# Patient Record
Sex: FEMALE | Race: WHITE | Employment: FULL TIME | URBAN - METROPOLITAN AREA
[De-identification: names, ages, dates, MRNs, and addresses within clinical notes are randomized per-mention and may not be internally consistent; named-entity substitution may affect disease eponyms.]

---

## 2018-04-03 ENCOUNTER — HOSPITAL ENCOUNTER (OUTPATIENT)
Facility: CLINIC | Age: 43
Discharge: HOME | End: 2018-04-03
Payer: COMMERCIAL

## 2018-04-03 LAB
T3 FREE: 1.78 PG/ML (ref 2.02–4.43)
THYROXINE, FREE: 1.38 NG/DL (ref 0.93–1.7)
TSH SERPL DL<=0.05 MIU/L-ACNC: 1.87 MIU/L (ref 0.3–5)

## 2018-04-03 PROCEDURE — 84443 ASSAY THYROID STIM HORMONE: CPT

## 2018-04-03 PROCEDURE — 84439 ASSAY OF FREE THYROXINE: CPT

## 2018-04-03 PROCEDURE — 36415 COLL VENOUS BLD VENIPUNCTURE: CPT

## 2018-04-03 PROCEDURE — 84481 FREE ASSAY (FT-3): CPT

## 2019-10-05 ENCOUNTER — HOSPITAL ENCOUNTER (OUTPATIENT)
Facility: CLINIC | Age: 44
Discharge: HOME | End: 2019-10-05
Payer: COMMERCIAL

## 2019-10-05 LAB
-: NORMAL
ALBUMIN SERPL-MCNC: 4.3 G/DL (ref 3.5–5.2)
ALBUMIN/GLOBULIN RATIO: 1.7 (ref 1–2.5)
ALP BLD-CCNC: 63 U/L (ref 35–104)
ALT SERPL-CCNC: 24 U/L (ref 5–33)
AMORPHOUS: NORMAL
ANION GAP SERPL CALCULATED.3IONS-SCNC: 13 MMOL/L (ref 9–17)
AST SERPL-CCNC: 34 U/L
BACTERIA: NORMAL
BILIRUB SERPL-MCNC: 0.9 MG/DL (ref 0.3–1.2)
BILIRUBIN URINE: ABNORMAL
BUN BLDV-MCNC: 18 MG/DL (ref 6–20)
BUN/CREAT BLD: ABNORMAL (ref 9–20)
CALCIUM SERPL-MCNC: 9.7 MG/DL (ref 8.6–10.4)
CASTS UA: NORMAL /LPF (ref 0–8)
CHLORIDE BLD-SCNC: 108 MMOL/L (ref 98–107)
CHOLESTEROL, FASTING: 162 MG/DL
CHOLESTEROL/HDL RATIO: 2.8
CO2: 27 MMOL/L (ref 20–31)
COLOR: ABNORMAL
COMMENT UA: ABNORMAL
CREAT SERPL-MCNC: 0.89 MG/DL (ref 0.5–0.9)
CRYSTALS, UA: NORMAL /HPF
EPITHELIAL CELLS UA: NORMAL /HPF (ref 0–5)
GFR AFRICAN AMERICAN: >60 ML/MIN
GFR NON-AFRICAN AMERICAN: >60 ML/MIN
GFR SERPL CREATININE-BSD FRML MDRD: ABNORMAL ML/MIN/{1.73_M2}
GFR SERPL CREATININE-BSD FRML MDRD: ABNORMAL ML/MIN/{1.73_M2}
GLUCOSE FASTING: 86 MG/DL (ref 70–99)
GLUCOSE URINE: NEGATIVE
HCT VFR BLD CALC: 43.4 % (ref 36.3–47.1)
HDLC SERPL-MCNC: 58 MG/DL
HEMOGLOBIN: 13.5 G/DL (ref 11.9–15.1)
KETONES, URINE: NEGATIVE
LDL CHOLESTEROL: 90 MG/DL (ref 0–130)
LEUKOCYTE ESTERASE, URINE: ABNORMAL
MCH RBC QN AUTO: 32.5 PG (ref 25.2–33.5)
MCHC RBC AUTO-ENTMCNC: 31.1 G/DL (ref 28.4–34.8)
MCV RBC AUTO: 104.6 FL (ref 82.6–102.9)
MUCUS: NORMAL
NITRITE, URINE: NEGATIVE
NRBC AUTOMATED: 0 PER 100 WBC
OTHER OBSERVATIONS UA: NORMAL
PDW BLD-RTO: 13.5 % (ref 11.8–14.4)
PH UA: 7 (ref 5–8)
PLATELET # BLD: 191 K/UL (ref 138–453)
PMV BLD AUTO: 11.7 FL (ref 8.1–13.5)
POTASSIUM SERPL-SCNC: 5.5 MMOL/L (ref 3.7–5.3)
PROTEIN UA: NEGATIVE
RBC # BLD: 4.15 M/UL (ref 3.95–5.11)
RBC UA: NORMAL /HPF (ref 0–4)
RENAL EPITHELIAL, UA: NORMAL /HPF
SODIUM BLD-SCNC: 148 MMOL/L (ref 135–144)
SPECIFIC GRAVITY UA: 1.02 (ref 1–1.03)
T3 FREE: 2.12 PG/ML (ref 2.02–4.43)
THYROXINE, FREE: 1.36 NG/DL (ref 0.93–1.7)
TOTAL PROTEIN: 6.9 G/DL (ref 6.4–8.3)
TRICHOMONAS: NORMAL
TRIGLYCERIDE, FASTING: 72 MG/DL
TSH SERPL DL<=0.05 MIU/L-ACNC: 2.96 MIU/L (ref 0.3–5)
TURBIDITY: CLEAR
URINE HGB: NEGATIVE
UROBILINOGEN, URINE: NORMAL
VLDLC SERPL CALC-MCNC: NORMAL MG/DL (ref 1–30)
WBC # BLD: 4.1 K/UL (ref 3.5–11.3)
WBC UA: NORMAL /HPF (ref 0–5)
YEAST: NORMAL

## 2019-10-05 PROCEDURE — 87086 URINE CULTURE/COLONY COUNT: CPT

## 2019-10-05 PROCEDURE — 84481 FREE ASSAY (FT-3): CPT

## 2019-10-05 PROCEDURE — 84439 ASSAY OF FREE THYROXINE: CPT

## 2019-10-05 PROCEDURE — 81001 URINALYSIS AUTO W/SCOPE: CPT

## 2019-10-05 PROCEDURE — 80053 COMPREHEN METABOLIC PANEL: CPT

## 2019-10-05 PROCEDURE — 36415 COLL VENOUS BLD VENIPUNCTURE: CPT

## 2019-10-05 PROCEDURE — 80061 LIPID PANEL: CPT

## 2019-10-05 PROCEDURE — 85027 COMPLETE CBC AUTOMATED: CPT

## 2019-10-05 PROCEDURE — 84443 ASSAY THYROID STIM HORMONE: CPT

## 2019-10-06 LAB
CULTURE: NO GROWTH
Lab: NORMAL
SPECIMEN DESCRIPTION: NORMAL

## 2020-01-29 ENCOUNTER — HOSPITAL ENCOUNTER (OUTPATIENT)
Facility: CLINIC | Age: 45
Discharge: HOME | End: 2020-01-29
Payer: COMMERCIAL

## 2020-01-29 LAB — HCG(URINE) PREGNANCY TEST: NEGATIVE

## 2020-01-29 PROCEDURE — 81025 URINE PREGNANCY TEST: CPT

## 2020-02-29 ENCOUNTER — HOSPITAL ENCOUNTER (OUTPATIENT)
Facility: CLINIC | Age: 45
Discharge: HOME | End: 2020-02-29
Payer: COMMERCIAL

## 2020-02-29 LAB
ABSOLUTE EOS #: 0.06 K/UL (ref 0–0.44)
ABSOLUTE IMMATURE GRANULOCYTE: <0.03 K/UL (ref 0–0.3)
ABSOLUTE LYMPH #: 0.89 K/UL (ref 1.1–3.7)
ABSOLUTE MONO #: 0.37 K/UL (ref 0.1–1.2)
ALT SERPL-CCNC: 29 U/L (ref 5–33)
AST SERPL-CCNC: 45 U/L
BASOPHILS # BLD: 1 % (ref 0–2)
BASOPHILS ABSOLUTE: 0.03 K/UL (ref 0–0.2)
CHOLESTEROL, FASTING: 176 MG/DL
CHOLESTEROL/HDL RATIO: 2.6
DIFFERENTIAL TYPE: ABNORMAL
EOSINOPHILS RELATIVE PERCENT: 1 % (ref 1–4)
GLUCOSE FASTING: 81 MG/DL (ref 70–99)
HCG QUANTITATIVE: <1 IU/L
HCT VFR BLD CALC: 44 % (ref 36.3–47.1)
HDLC SERPL-MCNC: 67 MG/DL
HEMOGLOBIN: 13.9 G/DL (ref 11.9–15.1)
IMMATURE GRANULOCYTES: 0 %
LDL CHOLESTEROL: 88 MG/DL (ref 0–130)
LYMPHOCYTES # BLD: 17 % (ref 24–43)
MCH RBC QN AUTO: 32.3 PG (ref 25.2–33.5)
MCHC RBC AUTO-ENTMCNC: 31.6 G/DL (ref 28.4–34.8)
MCV RBC AUTO: 102.3 FL (ref 82.6–102.9)
MONOCYTES # BLD: 7 % (ref 3–12)
NRBC AUTOMATED: 0 PER 100 WBC
PDW BLD-RTO: 13.7 % (ref 11.8–14.4)
PLATELET # BLD: 178 K/UL (ref 138–453)
PLATELET ESTIMATE: ABNORMAL
PMV BLD AUTO: 11.9 FL (ref 8.1–13.5)
RBC # BLD: 4.3 M/UL (ref 3.95–5.11)
RBC # BLD: ABNORMAL 10*6/UL
SEG NEUTROPHILS: 74 % (ref 36–65)
SEGMENTED NEUTROPHILS ABSOLUTE COUNT: 3.85 K/UL (ref 1.5–8.1)
TRIGLYCERIDE, FASTING: 103 MG/DL
VLDLC SERPL CALC-MCNC: NORMAL MG/DL (ref 1–30)
WBC # BLD: 5.2 K/UL (ref 3.5–11.3)
WBC # BLD: ABNORMAL 10*3/UL

## 2020-02-29 PROCEDURE — 84460 ALANINE AMINO (ALT) (SGPT): CPT

## 2020-02-29 PROCEDURE — 84702 CHORIONIC GONADOTROPIN TEST: CPT

## 2020-02-29 PROCEDURE — 36415 COLL VENOUS BLD VENIPUNCTURE: CPT

## 2020-02-29 PROCEDURE — 80061 LIPID PANEL: CPT

## 2020-02-29 PROCEDURE — 85025 COMPLETE CBC W/AUTO DIFF WBC: CPT

## 2020-02-29 PROCEDURE — 82947 ASSAY GLUCOSE BLOOD QUANT: CPT

## 2020-02-29 PROCEDURE — 84450 TRANSFERASE (AST) (SGOT): CPT

## 2020-05-05 ENCOUNTER — HOSPITAL ENCOUNTER (OUTPATIENT)
Age: 45
Setting detail: SPECIMEN
Discharge: HOME | End: 2020-05-05
Payer: COMMERCIAL

## 2020-05-05 LAB
ABSOLUTE EOS #: 0.09 K/UL (ref 0–0.44)
ABSOLUTE IMMATURE GRANULOCYTE: <0.03 K/UL (ref 0–0.3)
ABSOLUTE LYMPH #: 0.79 K/UL (ref 1.1–3.7)
ABSOLUTE MONO #: 0.43 K/UL (ref 0.1–1.2)
ALT SERPL-CCNC: 43 U/L (ref 5–33)
AST SERPL-CCNC: 49 U/L
BASOPHILS # BLD: 1 % (ref 0–2)
BASOPHILS ABSOLUTE: 0.03 K/UL (ref 0–0.2)
CHOLESTEROL, FASTING: 189 MG/DL
CHOLESTEROL/HDL RATIO: 3.5
DIFFERENTIAL TYPE: ABNORMAL
EOSINOPHILS RELATIVE PERCENT: 3 % (ref 1–4)
GLUCOSE FASTING: 89 MG/DL (ref 70–99)
HCG QUANTITATIVE: <1 IU/L
HCT VFR BLD CALC: 43.6 % (ref 36.3–47.1)
HDLC SERPL-MCNC: 54 MG/DL
HEMOGLOBIN: 13.8 G/DL (ref 11.9–15.1)
IMMATURE GRANULOCYTES: 0 %
LDL CHOLESTEROL: 118 MG/DL (ref 0–130)
LYMPHOCYTES # BLD: 22 % (ref 24–43)
MCH RBC QN AUTO: 33.2 PG (ref 25.2–33.5)
MCHC RBC AUTO-ENTMCNC: 31.7 G/DL (ref 28.4–34.8)
MCV RBC AUTO: 104.8 FL (ref 82.6–102.9)
MONOCYTES # BLD: 12 % (ref 3–12)
NRBC AUTOMATED: 0 PER 100 WBC
PDW BLD-RTO: 13.9 % (ref 11.8–14.4)
PLATELET # BLD: 183 K/UL (ref 138–453)
PLATELET ESTIMATE: ABNORMAL
PMV BLD AUTO: 11.7 FL (ref 8.1–13.5)
RBC # BLD: 4.16 M/UL (ref 3.95–5.11)
RBC # BLD: ABNORMAL 10*6/UL
SEG NEUTROPHILS: 62 % (ref 36–65)
SEGMENTED NEUTROPHILS ABSOLUTE COUNT: 2.33 K/UL (ref 1.5–8.1)
TRIGLYCERIDE, FASTING: 83 MG/DL
VLDLC SERPL CALC-MCNC: NORMAL MG/DL (ref 1–30)
WBC # BLD: 3.7 K/UL (ref 3.5–11.3)
WBC # BLD: ABNORMAL 10*3/UL

## 2020-05-05 PROCEDURE — 36415 COLL VENOUS BLD VENIPUNCTURE: CPT

## 2020-05-05 PROCEDURE — 80061 LIPID PANEL: CPT

## 2020-05-05 PROCEDURE — 85025 COMPLETE CBC W/AUTO DIFF WBC: CPT

## 2020-05-05 PROCEDURE — 84702 CHORIONIC GONADOTROPIN TEST: CPT

## 2020-05-05 PROCEDURE — 82947 ASSAY GLUCOSE BLOOD QUANT: CPT

## 2020-05-05 PROCEDURE — 84460 ALANINE AMINO (ALT) (SGPT): CPT

## 2020-05-05 PROCEDURE — 84450 TRANSFERASE (AST) (SGOT): CPT

## 2020-06-02 ENCOUNTER — HOSPITAL ENCOUNTER (OUTPATIENT)
Facility: CLINIC | Age: 45
Discharge: HOME | End: 2020-06-02
Payer: COMMERCIAL

## 2020-06-02 LAB — HCG(URINE) PREGNANCY TEST: NEGATIVE

## 2020-06-02 PROCEDURE — 81025 URINE PREGNANCY TEST: CPT

## 2020-07-08 ENCOUNTER — HOSPITAL ENCOUNTER (OUTPATIENT)
Facility: CLINIC | Age: 45
Discharge: HOME | End: 2020-07-08
Payer: COMMERCIAL

## 2020-07-08 LAB
ABSOLUTE EOS #: 0.07 K/UL (ref 0–0.44)
ABSOLUTE IMMATURE GRANULOCYTE: <0.03 K/UL (ref 0–0.3)
ABSOLUTE LYMPH #: 1.05 K/UL (ref 1.1–3.7)
ABSOLUTE MONO #: 0.48 K/UL (ref 0.1–1.2)
ALT SERPL-CCNC: 31 U/L (ref 5–33)
AST SERPL-CCNC: 41 U/L
BASOPHILS # BLD: 1 % (ref 0–2)
BASOPHILS ABSOLUTE: 0.03 K/UL (ref 0–0.2)
CHOLESTEROL/HDL RATIO: 3.2
CHOLESTEROL: 221 MG/DL
DIFFERENTIAL TYPE: ABNORMAL
EOSINOPHILS RELATIVE PERCENT: 2 % (ref 1–4)
GLUCOSE BLD-MCNC: 96 MG/DL (ref 70–99)
HCG QUANTITATIVE: <1 IU/L
HCT VFR BLD CALC: 43.7 % (ref 36.3–47.1)
HDLC SERPL-MCNC: 69 MG/DL
HEMOGLOBIN: 13.9 G/DL (ref 11.9–15.1)
IMMATURE GRANULOCYTES: 0 %
LDL CHOLESTEROL: 134 MG/DL (ref 0–130)
LYMPHOCYTES # BLD: 32 % (ref 24–43)
MCH RBC QN AUTO: 33.6 PG (ref 25.2–33.5)
MCHC RBC AUTO-ENTMCNC: 31.8 G/DL (ref 28.4–34.8)
MCV RBC AUTO: 105.6 FL (ref 82.6–102.9)
MONOCYTES # BLD: 15 % (ref 3–12)
NRBC AUTOMATED: 0 PER 100 WBC
PDW BLD-RTO: 13.7 % (ref 11.8–14.4)
PLATELET # BLD: 201 K/UL (ref 138–453)
PLATELET ESTIMATE: ABNORMAL
PMV BLD AUTO: 11.9 FL (ref 8.1–13.5)
RBC # BLD: 4.14 M/UL (ref 3.95–5.11)
RBC # BLD: ABNORMAL 10*6/UL
SEG NEUTROPHILS: 50 % (ref 36–65)
SEGMENTED NEUTROPHILS ABSOLUTE COUNT: 1.63 K/UL (ref 1.5–8.1)
TRIGL SERPL-MCNC: 91 MG/DL
VLDLC SERPL CALC-MCNC: ABNORMAL MG/DL (ref 1–30)
WBC # BLD: 3.3 K/UL (ref 3.5–11.3)
WBC # BLD: ABNORMAL 10*3/UL

## 2020-07-08 PROCEDURE — 84702 CHORIONIC GONADOTROPIN TEST: CPT

## 2020-07-08 PROCEDURE — 36415 COLL VENOUS BLD VENIPUNCTURE: CPT

## 2020-07-08 PROCEDURE — 84460 ALANINE AMINO (ALT) (SGPT): CPT

## 2020-07-08 PROCEDURE — 85025 COMPLETE CBC W/AUTO DIFF WBC: CPT

## 2020-07-08 PROCEDURE — 82947 ASSAY GLUCOSE BLOOD QUANT: CPT

## 2020-07-08 PROCEDURE — 80061 LIPID PANEL: CPT

## 2020-07-08 PROCEDURE — 84450 TRANSFERASE (AST) (SGOT): CPT

## 2020-08-10 ENCOUNTER — HOSPITAL ENCOUNTER (OUTPATIENT)
Facility: CLINIC | Age: 45
Discharge: HOME | End: 2020-08-10
Payer: COMMERCIAL

## 2020-08-10 LAB — HCG(URINE) PREGNANCY TEST: NEGATIVE

## 2020-08-10 PROCEDURE — 81025 URINE PREGNANCY TEST: CPT

## 2020-10-09 ENCOUNTER — HOSPITAL ENCOUNTER (EMERGENCY)
Facility: CLINIC | Age: 45
Discharge: ANOTHER HOSPITAL | End: 2020-10-09
Attending: EMERGENCY MEDICINE
Payer: COMMERCIAL

## 2020-10-09 ENCOUNTER — APPOINTMENT (OUTPATIENT)
Dept: CT IMAGING | Facility: CLINIC | Age: 45
End: 2020-10-09
Payer: COMMERCIAL

## 2020-10-09 VITALS
SYSTOLIC BLOOD PRESSURE: 118 MMHG | RESPIRATION RATE: 17 BRPM | OXYGEN SATURATION: 100 % | HEART RATE: 44 BPM | HEIGHT: 70 IN | DIASTOLIC BLOOD PRESSURE: 72 MMHG | TEMPERATURE: 98.1 F | WEIGHT: 145 LBS | BODY MASS INDEX: 20.76 KG/M2

## 2020-10-09 LAB
-: ABNORMAL
ABSOLUTE EOS #: 0 K/UL (ref 0–0.4)
ABSOLUTE IMMATURE GRANULOCYTE: ABNORMAL K/UL (ref 0–0.3)
ABSOLUTE LYMPH #: 0.9 K/UL (ref 1–4.8)
ABSOLUTE MONO #: 0.3 K/UL (ref 0.1–1.2)
ALBUMIN SERPL-MCNC: 4.4 G/DL (ref 3.5–5.2)
ALBUMIN/GLOBULIN RATIO: 2.1 (ref 1–2.5)
ALP BLD-CCNC: 75 U/L (ref 35–104)
ALT SERPL-CCNC: 21 U/L (ref 5–33)
AMORPHOUS: ABNORMAL
ANION GAP SERPL CALCULATED.3IONS-SCNC: 9 MMOL/L (ref 9–17)
AST SERPL-CCNC: 27 U/L
BACTERIA: ABNORMAL
BASOPHILS # BLD: 3 % (ref 0–2)
BASOPHILS ABSOLUTE: 0.1 K/UL (ref 0–0.2)
BILIRUB SERPL-MCNC: 0.4 MG/DL (ref 0.3–1.2)
BILIRUBIN URINE: NEGATIVE
BUN BLDV-MCNC: 28 MG/DL (ref 6–20)
BUN/CREAT BLD: ABNORMAL (ref 9–20)
CALCIUM SERPL-MCNC: 9 MG/DL (ref 8.6–10.4)
CASTS UA: ABNORMAL /LPF (ref 0–2)
CHLORIDE BLD-SCNC: 105 MMOL/L (ref 98–107)
CO2: 25 MMOL/L (ref 20–31)
COLOR: YELLOW
COMMENT UA: ABNORMAL
CREAT SERPL-MCNC: 0.6 MG/DL (ref 0.5–0.9)
CRYSTALS, UA: ABNORMAL /HPF
DIFFERENTIAL TYPE: ABNORMAL
EOSINOPHILS RELATIVE PERCENT: 1 % (ref 1–4)
EPITHELIAL CELLS UA: ABNORMAL /HPF (ref 0–5)
GFR AFRICAN AMERICAN: >60 ML/MIN
GFR NON-AFRICAN AMERICAN: >60 ML/MIN
GFR SERPL CREATININE-BSD FRML MDRD: ABNORMAL ML/MIN/{1.73_M2}
GFR SERPL CREATININE-BSD FRML MDRD: ABNORMAL ML/MIN/{1.73_M2}
GLUCOSE BLD-MCNC: 114 MG/DL (ref 70–99)
GLUCOSE URINE: NEGATIVE
HCG QUALITATIVE: NEGATIVE
HCT VFR BLD CALC: 37.7 % (ref 36–46)
HEMOGLOBIN: 12.2 G/DL (ref 12–16)
IMMATURE GRANULOCYTES: ABNORMAL %
KETONES, URINE: NEGATIVE
LEUKOCYTE ESTERASE, URINE: ABNORMAL
LIPASE: 15 U/L (ref 13–60)
LYMPHOCYTES # BLD: 22 % (ref 24–44)
MCH RBC QN AUTO: 32.3 PG (ref 26–34)
MCHC RBC AUTO-ENTMCNC: 32.5 G/DL (ref 31–37)
MCV RBC AUTO: 99.5 FL (ref 80–100)
MONOCYTES # BLD: 8 % (ref 2–11)
MUCUS: ABNORMAL
NITRITE, URINE: NEGATIVE
NRBC AUTOMATED: ABNORMAL PER 100 WBC
OTHER OBSERVATIONS UA: ABNORMAL
PDW BLD-RTO: 14.3 % (ref 12.5–15.4)
PH UA: 7 (ref 5–8)
PLATELET # BLD: 154 K/UL (ref 140–450)
PLATELET ESTIMATE: ABNORMAL
PMV BLD AUTO: 9.1 FL (ref 6–12)
POTASSIUM SERPL-SCNC: 4.1 MMOL/L (ref 3.7–5.3)
PROTEIN UA: NEGATIVE
RBC # BLD: 3.79 M/UL (ref 4–5.2)
RBC # BLD: ABNORMAL 10*6/UL
RBC UA: ABNORMAL /HPF (ref 0–2)
RENAL EPITHELIAL, UA: ABNORMAL /HPF
SEG NEUTROPHILS: 66 % (ref 36–66)
SEGMENTED NEUTROPHILS ABSOLUTE COUNT: 2.8 K/UL (ref 1.8–7.7)
SODIUM BLD-SCNC: 139 MMOL/L (ref 135–144)
SPECIFIC GRAVITY UA: 1.02 (ref 1–1.03)
TOTAL PROTEIN: 6.5 G/DL (ref 6.4–8.3)
TRICHOMONAS: ABNORMAL
TURBIDITY: CLEAR
URINE HGB: NEGATIVE
UROBILINOGEN, URINE: NORMAL
WBC # BLD: 4.1 K/UL (ref 3.5–11)
WBC # BLD: ABNORMAL 10*3/UL
WBC UA: ABNORMAL /HPF (ref 0–5)
YEAST: ABNORMAL

## 2020-10-09 PROCEDURE — 80053 COMPREHEN METABOLIC PANEL: CPT

## 2020-10-09 PROCEDURE — 96365 THER/PROPH/DIAG IV INF INIT: CPT

## 2020-10-09 PROCEDURE — 2580000003 HC RX 258: Performed by: EMERGENCY MEDICINE

## 2020-10-09 PROCEDURE — 81001 URINALYSIS AUTO W/SCOPE: CPT

## 2020-10-09 PROCEDURE — 96375 TX/PRO/DX INJ NEW DRUG ADDON: CPT

## 2020-10-09 PROCEDURE — 74177 CT ABD & PELVIS W/CONTRAST: CPT

## 2020-10-09 PROCEDURE — 96376 TX/PRO/DX INJ SAME DRUG ADON: CPT

## 2020-10-09 PROCEDURE — 2500000003 HC RX 250 WO HCPCS: Performed by: EMERGENCY MEDICINE

## 2020-10-09 PROCEDURE — 6360000004 HC RX CONTRAST MEDICATION: Performed by: EMERGENCY MEDICINE

## 2020-10-09 PROCEDURE — 99284 EMERGENCY DEPT VISIT MOD MDM: CPT

## 2020-10-09 PROCEDURE — 83690 ASSAY OF LIPASE: CPT

## 2020-10-09 PROCEDURE — 6360000002 HC RX W HCPCS: Performed by: EMERGENCY MEDICINE

## 2020-10-09 PROCEDURE — 36415 COLL VENOUS BLD VENIPUNCTURE: CPT

## 2020-10-09 PROCEDURE — 84703 CHORIONIC GONADOTROPIN ASSAY: CPT

## 2020-10-09 PROCEDURE — 85025 COMPLETE CBC W/AUTO DIFF WBC: CPT

## 2020-10-09 RX ORDER — LEVOTHYROXINE SODIUM 88 UG/1
88 TABLET ORAL DAILY
COMMUNITY
End: 2021-04-20 | Stop reason: SDUPTHER

## 2020-10-09 RX ORDER — SODIUM CHLORIDE 0.9 % (FLUSH) 0.9 %
10 SYRINGE (ML) INJECTION PRN
Status: DISCONTINUED | OUTPATIENT
Start: 2020-10-09 | End: 2020-10-09 | Stop reason: HOSPADM

## 2020-10-09 RX ORDER — CIPROFLOXACIN 2 MG/ML
400 INJECTION, SOLUTION INTRAVENOUS ONCE
Status: COMPLETED | OUTPATIENT
Start: 2020-10-09 | End: 2020-10-09

## 2020-10-09 RX ORDER — MORPHINE SULFATE 2 MG/ML
2 INJECTION, SOLUTION INTRAMUSCULAR; INTRAVENOUS ONCE
Status: COMPLETED | OUTPATIENT
Start: 2020-10-09 | End: 2020-10-09

## 2020-10-09 RX ORDER — ONDANSETRON 2 MG/ML
4 INJECTION INTRAMUSCULAR; INTRAVENOUS ONCE
Status: COMPLETED | OUTPATIENT
Start: 2020-10-09 | End: 2020-10-09

## 2020-10-09 RX ORDER — KETOROLAC TROMETHAMINE 30 MG/ML
30 INJECTION, SOLUTION INTRAMUSCULAR; INTRAVENOUS ONCE
Status: COMPLETED | OUTPATIENT
Start: 2020-10-09 | End: 2020-10-09

## 2020-10-09 RX ORDER — 0.9 % SODIUM CHLORIDE 0.9 %
70 INTRAVENOUS SOLUTION INTRAVENOUS ONCE
Status: COMPLETED | OUTPATIENT
Start: 2020-10-09 | End: 2020-10-09

## 2020-10-09 RX ADMIN — MORPHINE SULFATE 2 MG: 2 INJECTION, SOLUTION INTRAMUSCULAR; INTRAVENOUS at 17:38

## 2020-10-09 RX ADMIN — CIPROFLOXACIN 400 MG: 2 INJECTION, SOLUTION INTRAVENOUS at 17:13

## 2020-10-09 RX ADMIN — SODIUM CHLORIDE, PRESERVATIVE FREE 10 ML: 5 INJECTION INTRAVENOUS at 14:08

## 2020-10-09 RX ADMIN — ONDANSETRON 4 MG: 2 INJECTION INTRAMUSCULAR; INTRAVENOUS at 17:36

## 2020-10-09 RX ADMIN — METRONIDAZOLE 500 MG: 500 INJECTION, SOLUTION INTRAVENOUS at 18:24

## 2020-10-09 RX ADMIN — SODIUM CHLORIDE 70 ML: 9 INJECTION, SOLUTION INTRAVENOUS at 14:08

## 2020-10-09 RX ADMIN — KETOROLAC TROMETHAMINE 30 MG: 30 INJECTION, SOLUTION INTRAMUSCULAR at 13:19

## 2020-10-09 RX ADMIN — IOPAMIDOL 75 ML: 755 INJECTION, SOLUTION INTRAVENOUS at 14:08

## 2020-10-09 RX ADMIN — ONDANSETRON 4 MG: 2 INJECTION INTRAMUSCULAR; INTRAVENOUS at 13:17

## 2020-10-09 SDOH — HEALTH STABILITY: MENTAL HEALTH: HOW OFTEN DO YOU HAVE A DRINK CONTAINING ALCOHOL?: NEVER

## 2020-10-09 ASSESSMENT — ENCOUNTER SYMPTOMS
ABDOMINAL PAIN: 1
CONSTIPATION: 0
SHORTNESS OF BREATH: 0
ABDOMINAL DISTENTION: 1
BACK PAIN: 1
BLOOD IN STOOL: 0
VOMITING: 0
EYE PAIN: 0
DIARRHEA: 0
NAUSEA: 1
COUGH: 0

## 2020-10-09 ASSESSMENT — PAIN DESCRIPTION - LOCATION
LOCATION: ABDOMEN
LOCATION: ABDOMEN

## 2020-10-09 ASSESSMENT — PAIN DESCRIPTION - ORIENTATION: ORIENTATION: LOWER

## 2020-10-09 ASSESSMENT — PAIN SCALES - GENERAL
PAINLEVEL_OUTOF10: 6
PAINLEVEL_OUTOF10: 6
PAINLEVEL_OUTOF10: 3
PAINLEVEL_OUTOF10: 6

## 2020-10-09 ASSESSMENT — PAIN DESCRIPTION - PAIN TYPE
TYPE: ACUTE PAIN
TYPE: ACUTE PAIN

## 2020-10-09 ASSESSMENT — PAIN DESCRIPTION - FREQUENCY: FREQUENCY: CONTINUOUS

## 2020-10-09 ASSESSMENT — PAIN DESCRIPTION - DESCRIPTORS: DESCRIPTORS: SHARP

## 2020-10-09 NOTE — ED NOTES
[facility]. TRANSPORT HERE REPORT TO HASEEB Scott RN  10/09/20 1946

## 2020-10-09 NOTE — ED NOTES
[facility] notified of potential admission to [facility], [address] [place] [address]  10/09/20 2588

## 2020-10-09 NOTE — ED NOTES
Dr. Sylvie Lala speaking with Dr. Bri Braga regarding pat admission.  Pt and family informed 8-10 hr wait for beds at [facility]  10/09/20 STEVE Cooper  10/09/20 3000

## 2020-10-09 NOTE — ED NOTES
Dr Smith Devoid speaking to Dr. Ernestine Vang I regarding patient admission     Zelalem Roberto RN  10/09/20 6439

## 2020-10-09 NOTE — ED NOTES
[facility] [place] [phone].       Reyes Sciara, RN  10/09/20 5059

## 2020-10-09 NOTE — ED PROVIDER NOTES
[facility]  [facility]  Phone: [phone]        Pt Name: Jaison Franklin  MRN: [id number]  [place] 1975  Date of evaluation: 10/9/20      CHIEF COMPLAINT       Chief Complaint   Patient presents with    Abdominal Pain     lower abd pain since 0400         HISTORY OF PRESENT ILLNESS    Jaison Franklin is a 39 y.o. female who presents with chief complaint of lower abdominal pain it started 4:00 in the morning it is crampy her abdomen feels bloated it radiates to her back is a little bit nauseous she thinks because of the pain is been no vaginal bleeding or discharge no urinary problems no fevers or chills or cough no diarrhea  Patient denies any abdominal surgeries does have the implant in for contraception  REVIEW OF SYSTEMS         Review of Systems   Constitutional: Negative for chills and fever. HENT: Negative for congestion and ear pain. Eyes: Negative for pain and visual disturbance. Respiratory: Negative for cough and shortness of breath. Cardiovascular: Negative for chest pain, palpitations and leg swelling. Gastrointestinal: Positive for abdominal distention, abdominal pain and nausea. Negative for blood in stool, constipation, diarrhea and vomiting. Endocrine: Negative for polydipsia and polyuria. Genitourinary: Negative for difficulty urinating, dysuria, frequency, vaginal bleeding and vaginal discharge. Musculoskeletal: Positive for back pain. Negative for joint swelling, myalgias, neck pain and neck stiffness. Skin: Negative for rash. Neurological: Negative for dizziness, weakness and headaches. Hematological: Negative for adenopathy. Does not bruise/bleed easily. Psychiatric/Behavioral: Negative for confusion, self-injury and suicidal ideas. PAST MEDICAL HISTORY    has a past medical history of Thyroid disease.     SURGICAL HISTORY      has a past surgical history that includes Anterior cruciate ligament repair and Shoulder arthroscopy. CURRENT MEDICATIONS       Discharge Medication List as of 10/9/2020  7:21 PM      CONTINUE these medications which have NOT CHANGED    Details   levothyroxine (SYNTHROID) 88 MCG tablet Take 88 mcg by mouth DailyHistorical Med      levonorgestrel (MIRENA, 52 MG,) IUD 52 mg 1 each by Intrauterine route once, Intrauterine, ONCE, Historical Med             ALLERGIES     has No Known Allergies. FAMILY HISTORY     has no family status information on file. family history is not on file. SOCIAL HISTORY      reports that she has never smoked. She has never used smokeless tobacco. She reports that she does not drink alcohol. PHYSICAL EXAM     INITIAL VITALS:  height is 5' 10\" (1.778 m) and weight is 65.8 kg (145 lb). Her temperature is 98.1 °F (36.7 °C). Her blood pressure is 118/72 and her pulse is 44 (abnormal). Her respiration is 17 and oxygen saturation is 100%. Physical Exam  Constitutional:       General: She is not in acute distress. Appearance: She is well-developed and normal weight. She is not ill-appearing, toxic-appearing or diaphoretic. HENT:      Head: Normocephalic and atraumatic. Right Ear: External ear normal.      Left Ear: External ear normal.   Eyes:      Conjunctiva/sclera: Conjunctivae normal.      Pupils: Pupils are equal, round, and reactive to light. Neck:      Musculoskeletal: Normal range of motion. Cardiovascular:      Rate and Rhythm: Normal rate and regular rhythm. Pulmonary:      Effort: Pulmonary effort is normal.      Breath sounds: Normal breath sounds. Abdominal:      General: Abdomen is protuberant. Bowel sounds are normal. There is distension. Palpations: Abdomen is soft. There is no fluid wave or mass. Tenderness: There is generalized abdominal tenderness. Musculoskeletal:         General: No tenderness. Skin:     General: Skin is warm and dry.    Neurological:      Mental Status: She is alert and oriented to person, place, and time. Psychiatric:         Behavior: Behavior normal.           DIFFERENTIAL DIAGNOSIS/ MDM:     Abdominal pain pain will do a work-up    DIAGNOSTIC RESULTS     EKG: All EKG's are interpreted by the Emergency Department Physician who either signs or Co-signs this chart in the absence of a cardiologist.        RADIOLOGY:   Non-plain film images such as CT, Ultrasound and MRI are read by the radiologist. Plain radiographic images are visualized and the radiologist interpretations are reviewed as follows:        EXAMINATION:    CT OF THE ABDOMEN AND PELVIS WITH CONTRAST 10/9/2020 2:03 pm         TECHNIQUE:    CT of the abdomen and pelvis was performed with the administration of    intravenous contrast. Multiplanar reformatted images are provided for review. Dose modulation, iterative reconstruction, and/or weight based adjustment of    the mA/kV was utilized to reduce the radiation dose to as low as reasonably    achievable.         COMPARISON:    No priors         HISTORY:    ORDERING SYSTEM PROVIDED HISTORY: Pain    TECHNOLOGIST PROVIDED HISTORY:    IV Only Contrast    Pain    Reason for Exam: Generalized abdomen pain that radiates to her back. No injury    Acuity: Acute    Type of Exam: Initial         FINDINGS:    Lower Chest: The visualized heart and lungs show no acute abnormalities.         Organs: Liver, spleen, pancreas, kidneys, adrenal glands and gallbladder show    no significant abnormalities.         GI/Bowel: There is limited evaluation due to absence of oral contrast.    Stomach grossly normal.  There is circumferential mural thickening of a short    segment of terminal ileum about 7 cm at the ileocecal junction.  There is    borderline/mild dilatation of the immediately preceding upstream loops with    suggestion of some stool stasis.  Findings concerning for ileitis with a    developing low-grade partial obstruction.  More proximally the small-bowel    loops are normal in caliber.  Increased stool throughout the colon suggests    constipation.  No acute process in the colon.         Pelvis: Urinary bladder and uterus grossly normal.  IUD in place.         Peritoneum/Retroperitoneum: No free intraperitoneal fluid or significant    lymphadenopathy.  Rather prominent IVC and iliac veins nonspecific but may be    due to fluid overload.         Bones/Soft Tissues: No acute abnormality of the bones.  The superficial soft    tissues show no significant abnormalities.              Impression    1. Circumferential mural thickening of a short segment of terminal ileum    including the ileocecal junction about 7 cm in length.  Concern for ileitis. Proximal to this location there is borderline/mild dilatation of the    immediately preceding upstream some small bowel loops with suggestion of some    stool stasis.  Findings are concerning for a developing low-grade partial    small bowel obstruction from the suspected inflammatory changes in the ileum.     2. Increased stool in the colon suggests constipation.               LABS:  Results for orders placed or performed during the hospital encounter of 10/09/20   Comprehensive Metabolic Panel   Result Value Ref Range    Glucose 114 (H) 70 - 99 mg/dL    BUN 28 (H) 6 - 20 mg/dL    CREATININE 0.60 0.50 - 0.90 mg/dL    Bun/Cre Ratio NOT REPORTED 9 - 20    Calcium 9.0 8.6 - 10.4 mg/dL    Sodium 139 135 - 144 mmol/L    Potassium 4.1 3.7 - 5.3 mmol/L    Chloride 105 98 - 107 mmol/L    CO2 25 20 - 31 mmol/L    Anion Gap 9 9 - 17 mmol/L    Alkaline Phosphatase 75 35 - 104 U/L    ALT 21 5 - 33 U/L    AST 27 <32 U/L    Total Bilirubin 0.40 0.3 - 1.2 mg/dL    Total Protein 6.5 6.4 - 8.3 g/dL    Alb 4.4 3.5 - 5.2 g/dL    Albumin/Globulin Ratio 2.1 1.0 - 2.5    GFR Non-African American >60 >60 mL/min    GFR African American >60 >60 mL/min    GFR Comment          GFR Staging NOT REPORTED    CBC Auto Differential   Result Value Ref Range    WBC 4.1 3.5 - 11.0 k/uL    RBC 3.79 (L) 4.0 - 5.2 m/uL    Hemoglobin 12.2 12.0 - 16.0 g/dL    Hematocrit 37.7 36 - 46 %    MCV 99.5 80 - 100 fL    MCH 32.3 26 - 34 pg    MCHC 32.5 31 - 37 g/dL    RDW 14.3 12.5 - 15.4 %    Platelets 824 795 - 526 k/uL    MPV 9.1 6.0 - 12.0 fL    NRBC Automated NOT REPORTED per 100 WBC    Differential Type NOT REPORTED     Seg Neutrophils 66 36 - 66 %    Lymphocytes 22 (L) 24 - 44 %    Monocytes 8 2 - 11 %    Eosinophils % 1 1 - 4 %    Basophils 3 (H) 0 - 2 %    Immature Granulocytes NOT REPORTED 0 %    Segs Absolute 2.80 1.8 - 7.7 k/uL    Absolute Lymph # 0.90 (L) 1.0 - 4.8 k/uL    Absolute Mono # 0.30 0.1 - 1.2 k/uL    Absolute Eos # 0.00 0.0 - 0.4 k/uL    Basophils Absolute 0.10 0.0 - 0.2 k/uL    Absolute Immature Granulocyte NOT REPORTED 0.00 - 0.30 k/uL    WBC Morphology NOT REPORTED     RBC Morphology NOT REPORTED     Platelet Estimate NOT REPORTED    Lipase   Result Value Ref Range    Lipase 15 13 - 60 U/L   Urinalysis   Result Value Ref Range    Color, UA YELLOW YELLOW    Turbidity UA CLEAR CLEAR    Glucose, Ur NEGATIVE NEGATIVE    Bilirubin Urine NEGATIVE NEGATIVE    Ketones, Urine NEGATIVE NEGATIVE    Specific Gravity, UA 1.020 1.005 - 1.030    Urine Hgb NEGATIVE NEGATIVE    pH, UA 7.0 5.0 - 8.0    Protein, UA NEGATIVE NEGATIVE    Urobilinogen, Urine Normal Normal    Nitrite, Urine NEGATIVE NEGATIVE    Leukocyte Esterase, Urine TRACE (A) NEGATIVE    Urinalysis Comments NOT REPORTED    HCG Qualitative, Serum   Result Value Ref Range    hCG Qual NEGATIVE NEGATIVE   Microscopic Urinalysis   Result Value Ref Range    -          WBC, UA 0 TO 2 0 - 5 /HPF    RBC, UA None 0 - 2 /HPF    Casts UA NOT REPORTED 0 - 2 /LPF    Crystals, UA NOT REPORTED None /HPF    Epithelial Cells UA 5 TO 10 0 - 5 /HPF    Renal Epithelial, UA NOT REPORTED 0 /HPF    Bacteria, UA None None    Mucus, UA NOT REPORTED None    Trichomonas, UA NOT REPORTED None    Amorphous, UA NOT REPORTED None    Other Observations UA (A) NOT REQ. Utilizing a urinalysis as the only screening method to exclude a potential uropathogen can be unreliable in many patient populations. Rapid screening tests are less sensitive than culture and if UTI is a clinical possibility, culture should be considered despite a negative urinalysis. Yeast, UA NOT REPORTED None           EMERGENCY DEPARTMENT COURSE:   Vitals:    Vitals:    10/09/20 1244 10/09/20 1735   BP: (!) 128/91 118/72   Pulse: 51 (!) 44   Resp: 18 17   Temp: 98.1 °F (36.7 °C)    SpO2: 98% 100%   Weight: 65.8 kg (145 lb)    Height: 5' 10\" (1.778 m)      -------------------------  BP: 118/72, Temp: 98.1 °F (36.7 °C), Pulse: (!) 44, Resp: 17      Patient request to go to [facility] I discussed the case with Dr. Tolbert Semen the hospitalist there as they do not have any GI over the weekend he recommended [facility] I have discussed the case with Dr. Nena Franks who is the hospitalist there he agreed to admit the patient    CONSULTS:      PROCEDURES:  None    FINAL IMPRESSION      1. Terminal ileitis with other complication ([address]    2. Partial small bowel obstruction ([facility] [address] [facility]          DISPOSITION/PLAN   Transferred in stabilized condition to  [address] [address]:  No follow-up provider specified. DISCHARGE MEDICATIONS:  Discharge Medication List as of 10/9/2020  7:21 PM          (Please note that portions of this note were completed with a voice recognition program.  Efforts were made to edit the dictations but occasionally words are mis-transcribed.)    [place] MD, F.A.A.E.M.   Attending Emergency Medicine Physician      Eva Israel MD  10/15/20 9573

## 2020-12-03 ENCOUNTER — OFFICE VISIT (OUTPATIENT)
Dept: FAMILY MEDICINE CLINIC | Age: 45
End: 2020-12-03
Payer: COMMERCIAL

## 2020-12-03 VITALS
SYSTOLIC BLOOD PRESSURE: 114 MMHG | BODY MASS INDEX: 22.33 KG/M2 | RESPIRATION RATE: 16 BRPM | OXYGEN SATURATION: 98 % | TEMPERATURE: 98.2 F | WEIGHT: 156 LBS | DIASTOLIC BLOOD PRESSURE: 82 MMHG | HEART RATE: 54 BPM | HEIGHT: 70 IN

## 2020-12-03 PROBLEM — E03.9 HYPOTHYROIDISM: Status: ACTIVE | Noted: 2020-12-03

## 2020-12-03 PROCEDURE — 99204 OFFICE O/P NEW MOD 45 MIN: CPT | Performed by: FAMILY MEDICINE

## 2020-12-03 RX ORDER — M-VIT,TX,IRON,MINS/CALC/FOLIC 27MG-0.4MG
1 TABLET ORAL DAILY
COMMUNITY

## 2020-12-03 RX ORDER — MULTIVITAMIN WITH IRON
250 TABLET ORAL DAILY
COMMUNITY

## 2020-12-03 RX ORDER — CHOLECALCIFEROL (VITAMIN D3) 125 MCG
1 CAPSULE ORAL DAILY
COMMUNITY

## 2020-12-03 RX ORDER — ASCORBIC ACID 500 MG
1 TABLET ORAL DAILY
COMMUNITY

## 2020-12-03 ASSESSMENT — PATIENT HEALTH QUESTIONNAIRE - PHQ9
SUM OF ALL RESPONSES TO PHQ QUESTIONS 1-9: 0
SUM OF ALL RESPONSES TO PHQ9 QUESTIONS 1 & 2: 0
2. FEELING DOWN, DEPRESSED OR HOPELESS: 0
SUM OF ALL RESPONSES TO PHQ QUESTIONS 1-9: 0
1. LITTLE INTEREST OR PLEASURE IN DOING THINGS: 0
SUM OF ALL RESPONSES TO PHQ QUESTIONS 1-9: 0

## 2020-12-03 ASSESSMENT — ENCOUNTER SYMPTOMS
CONSTIPATION: 0
NAUSEA: 0
RHINORRHEA: 0
DIARRHEA: 0
SORE THROAT: 0
CHEST TIGHTNESS: 0
VOMITING: 0
ABDOMINAL DISTENTION: 0
COUGH: 0
ABDOMINAL PAIN: 0
SHORTNESS OF BREATH: 0
BACK PAIN: 0

## 2020-12-03 NOTE — PROGRESS NOTES
Ofelia Weinstein MD  [address]  [id number]  [place] [address]  [address] [address]  Dept: [phone]  Dept Fax: [phone]     Patient ID: Betito Brenner is a 39 y.o. female. HPI    Betito Brenner is a 39 y.o. female who presents to the office today for a first visit and to establish a relationship with a new primary care physician. Today, the patient complains of nothing. Pt denies any N/V/D/C or abdominal pain. Pt denies any fever or chills. Pt today denies any HA, chest pain, or SOB. Pt about a month ago was having loose stools and abdominal cramping and did see GI and had a colonoscopy and was normal except for a hyperplastic polyp. 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 She states she did have Covid and it went through her entire family. She is  with 3 children. She does follow with Gyn for her Gyn exams and does have an IUD. Pt does work out daily and lifts and runs most days of the week. The patient's past medical, surgical, social, and family history as well as her current medications and allergies were reviewed as documented in today's encounter.       Current Outpatient Medications on File Prior to Visit   Medication Sig Dispense Refill    vitamin C (ASCORBIC ACID) 500 MG tablet Take 1 tablet by mouth daily      Biotin 2.5 MG CAPS Take 2,500 mcg by mouth daily      Cyanocobalamin 1000 MCG SUBL Take 1 tablet by mouth daily      magnesium (MAGNESIUM-OXIDE) 250 MG TABS tablet Take 250 mg by mouth daily      Multiple Vitamins-Minerals (THERAPEUTIC MULTIVITAMIN-MINERALS) tablet Take 1 tablet by mouth daily      Cholecalciferol (VITAMIN D) 125 MCG (5000 UT) CAPS Take 1 capsule by mouth daily      levothyroxine (SYNTHROID) 88 MCG tablet Take 88 mcg by mouth Daily      levonorgestrel (MIRENA, 52 MG,) IUD 52 mg 1 each by Intrauterine route once       No current facility-administered medications on file prior to visit. Subjective:     Review of Systems   Constitutional: Negative for appetite change, fatigue and fever. HENT: Negative for congestion, ear pain, rhinorrhea and sore throat. Respiratory: Negative for cough, chest tightness and shortness of breath. Cardiovascular: Negative for chest pain and palpitations. Gastrointestinal: Negative for abdominal distention, abdominal pain, constipation, diarrhea, nausea and vomiting. Genitourinary: Negative for difficulty urinating and dysuria. Musculoskeletal: Negative for arthralgias, back pain and myalgias. Skin: Negative for rash. Neurological: Negative for dizziness, weakness, light-headedness and headaches. Hematological: Negative for adenopathy. Psychiatric/Behavioral: Negative for behavioral problems and sleep disturbance. The patient is not nervous/anxious. Objective:     Physical Exam  Vitals signs reviewed. Constitutional:       General: She is not in acute distress. Appearance: She is well-developed. HENT:      Head: Normocephalic and atraumatic. Right Ear: External ear normal.      Left Ear: External ear normal.      Nose: Nose normal.      Mouth/Throat:      Pharynx: No oropharyngeal exudate. Eyes:      Conjunctiva/sclera: Conjunctivae normal.      Pupils: Pupils are equal, round, and reactive to light. Neck:      Musculoskeletal: Normal range of motion. Cardiovascular:      Rate and Rhythm: Normal rate and regular rhythm. Heart sounds: Normal heart sounds. No murmur. Pulmonary:      Effort: Pulmonary effort is normal. No respiratory distress. Breath sounds: Normal breath sounds. No wheezing. Chest:      Chest wall: No tenderness. Abdominal:      General: Bowel sounds are normal. There is no distension. Palpations: Abdomen is soft. There is no mass. Tenderness: There is no abdominal tenderness. Musculoskeletal: Normal range of motion. General: No tenderness. Lymphadenopathy:      Cervical: No cervical adenopathy. Skin:     Findings: No rash. Neurological:      Mental Status: She is alert and oriented to person, place, and time. Deep Tendon Reflexes: Reflexes are normal and symmetric. Psychiatric:         Behavior: Behavior normal.         Assessment:      Diagnosis Orders   1. Annual physical exam  Comprehensive Metabolic Panel    Lipid Panel   2. Hypothyroidism, unspecified type  T4, Free    TSH without Reflex    T3, Free   3. Screening cholesterol level  Lipid Panel   4. Neutropenia, unspecified type ([address]  CBC Auto Differential    Path Review, Smear          Plan:     Orders Placed This Encounter   Procedures    Comprehensive Metabolic Panel     Standing Status:   Future     Standing Expiration Date:   12/3/2021    Lipid Panel     Standing Status:   Future     Standing Expiration Date:   12/3/2021     Order Specific Question:   Is Patient Fasting?/# of Hours     Answer:   8-10 Hours    T4, Free     Standing Status:   Future     Standing Expiration Date:   12/3/2021    TSH without Reflex     Standing Status:   Future     Standing Expiration Date:   12/3/2021    CBC Auto Differential     Standing Status:   Future     Standing Expiration Date:   12/3/2021    Path Review, Smear     Standing Status:   Future     Standing Expiration Date:   12/3/2021    T3, Free     Standing Status:   Future     Standing Expiration Date:   12/3/2021        Cont with current treatment and keep scheduled appt. Will cont with Synthroid 88mcg daily    Will recheck a CBC with diff and peripheral smear secondary to the neutropenia on her last several blood draws    Cont with a healthy diet and exercise most days of the week - she is actually trying to cut back on the amount and time she spends exercising    Get labs now and will call with results    Rest of systems unchanged, continue current treatments.     Medications, labs, diagnostic studies, consultations and follow-up as documented in this encounter. Rest of systems unchanged, continue current treatments    I have spent 30 minutes face to face with the patient more than 50% of this time was spent counseling and coordinating care. Ofelia Antunez MD

## 2021-01-21 LAB — MAMMOGRAPHY, EXTERNAL: NORMAL

## 2021-04-20 ENCOUNTER — TELEPHONE (OUTPATIENT)
Dept: FAMILY MEDICINE CLINIC | Age: 46
End: 2021-04-20

## 2021-04-20 RX ORDER — LEVOTHYROXINE SODIUM 88 UG/1
88 TABLET ORAL DAILY
Qty: 90 TABLET | Refills: 3 | Status: SHIPPED | OUTPATIENT
Start: 2021-04-20 | End: 2021-06-14 | Stop reason: SDUPTHER

## 2021-04-20 NOTE — TELEPHONE ENCOUNTER
Patient was seen last December 2020 as a new patient and is calling because she needs a refill of her levothyroxine sent to her mail order and also because her brother was recently diagnosed with colon and liver cancer and she wants to know what testing she should have done. She did have a colonoscopy done on 11/03/20 and she said it showed a polyp but she doesn't know when she should have it done again. I explained that she needs to call that office to get information on when to repeat it. She does have lab orders but said they aren't to be done until June.

## 2021-06-13 ENCOUNTER — PATIENT MESSAGE (OUTPATIENT)
Dept: FAMILY MEDICINE CLINIC | Age: 46
End: 2021-06-13

## 2021-06-14 RX ORDER — LEVOTHYROXINE SODIUM 88 UG/1
88 TABLET ORAL DAILY
Qty: 90 TABLET | Refills: 3 | Status: SHIPPED | OUTPATIENT
Start: 2021-06-14 | End: 2021-08-09 | Stop reason: SDUPTHER

## 2021-06-14 NOTE — TELEPHONE ENCOUNTER
From: Guy Ahumada  To: Andrea Garcia MD  Sent: 6/13/2021 7:49 AM EDT  Subject: Prescription Question    Can I get another script for bloodwork before my next visit and I was wondering if Dr Elena Macias can you send one more month supply of thyroid medicine, I only have 4 more pills left.    Thank you

## 2021-07-06 ENCOUNTER — HOSPITAL ENCOUNTER (OUTPATIENT)
Facility: CLINIC | Age: 46
Discharge: HOME | End: 2021-07-06
Payer: COMMERCIAL

## 2021-07-06 DIAGNOSIS — E03.9 HYPOTHYROIDISM, UNSPECIFIED TYPE: ICD-10-CM

## 2021-07-06 DIAGNOSIS — Z00.00 ANNUAL PHYSICAL EXAM: ICD-10-CM

## 2021-07-06 DIAGNOSIS — D70.9 NEUTROPENIA, UNSPECIFIED TYPE (HCC): ICD-10-CM

## 2021-07-06 DIAGNOSIS — Z13.220 SCREENING CHOLESTEROL LEVEL: ICD-10-CM

## 2021-07-06 LAB
ABSOLUTE EOS #: 0.09 K/UL (ref 0–0.44)
ABSOLUTE IMMATURE GRANULOCYTE: <0.03 K/UL (ref 0–0.3)
ABSOLUTE LYMPH #: 0.95 K/UL (ref 1.1–3.7)
ABSOLUTE MONO #: 0.41 K/UL (ref 0.1–1.2)
ABSOLUTE RETIC #: 0.07 M/UL (ref 0.03–0.08)
ALBUMIN SERPL-MCNC: 4.3 G/DL (ref 3.5–5.2)
ALBUMIN/GLOBULIN RATIO: 1.8 (ref 1–2.5)
ALP BLD-CCNC: 79 U/L (ref 35–104)
ALT SERPL-CCNC: 22 U/L (ref 5–33)
ANION GAP SERPL CALCULATED.3IONS-SCNC: 13 MMOL/L (ref 9–17)
AST SERPL-CCNC: 35 U/L
BASOPHILS # BLD: 1 % (ref 0–2)
BASOPHILS ABSOLUTE: 0.03 K/UL (ref 0–0.2)
BILIRUB SERPL-MCNC: 0.42 MG/DL (ref 0.3–1.2)
BUN BLDV-MCNC: 16 MG/DL (ref 6–20)
BUN/CREAT BLD: ABNORMAL (ref 9–20)
CALCIUM SERPL-MCNC: 9.1 MG/DL (ref 8.6–10.4)
CHLORIDE BLD-SCNC: 108 MMOL/L (ref 98–107)
CHOLESTEROL/HDL RATIO: 2.8
CHOLESTEROL: 180 MG/DL
CO2: 21 MMOL/L (ref 20–31)
CREAT SERPL-MCNC: 0.78 MG/DL (ref 0.5–0.9)
DIFFERENTIAL TYPE: ABNORMAL
EOSINOPHILS RELATIVE PERCENT: 2 % (ref 1–4)
GFR AFRICAN AMERICAN: >60 ML/MIN
GFR NON-AFRICAN AMERICAN: >60 ML/MIN
GFR SERPL CREATININE-BSD FRML MDRD: ABNORMAL ML/MIN/{1.73_M2}
GFR SERPL CREATININE-BSD FRML MDRD: ABNORMAL ML/MIN/{1.73_M2}
GLUCOSE BLD-MCNC: 87 MG/DL (ref 70–99)
HCT VFR BLD CALC: 43.9 % (ref 36.3–47.1)
HDLC SERPL-MCNC: 65 MG/DL
HEMOGLOBIN: 13.9 G/DL (ref 11.9–15.1)
IMMATURE GRANULOCYTES: 0 %
IMMATURE RETIC FRACT: 12.9 % (ref 2.7–18.3)
LDL CHOLESTEROL: 102 MG/DL (ref 0–130)
LYMPHOCYTES # BLD: 24 % (ref 24–43)
MCH RBC QN AUTO: 32.7 PG (ref 25.2–33.5)
MCHC RBC AUTO-ENTMCNC: 31.7 G/DL (ref 28.4–34.8)
MCV RBC AUTO: 103.3 FL (ref 82.6–102.9)
MONOCYTES # BLD: 10 % (ref 3–12)
NRBC AUTOMATED: 0 PER 100 WBC
PDW BLD-RTO: 13 % (ref 11.8–14.4)
PLATELET # BLD: 197 K/UL (ref 138–453)
PLATELET ESTIMATE: ABNORMAL
PMV BLD AUTO: 11.5 FL (ref 8.1–13.5)
POTASSIUM SERPL-SCNC: 4.6 MMOL/L (ref 3.7–5.3)
RBC # BLD: 4.25 M/UL (ref 3.95–5.11)
RBC # BLD: ABNORMAL 10*6/UL
RETIC %: 1.6 % (ref 0.5–1.9)
RETIC HEMOGLOBIN: 36.1 PG (ref 28.2–35.7)
SEG NEUTROPHILS: 63 % (ref 36–65)
SEGMENTED NEUTROPHILS ABSOLUTE COUNT: 2.5 K/UL (ref 1.5–8.1)
SODIUM BLD-SCNC: 142 MMOL/L (ref 135–144)
T3 FREE: 1.92 PG/ML (ref 2.02–4.43)
THYROXINE, FREE: 1.18 NG/DL (ref 0.93–1.7)
TOTAL PROTEIN: 6.7 G/DL (ref 6.4–8.3)
TRIGL SERPL-MCNC: 64 MG/DL
TSH SERPL DL<=0.05 MIU/L-ACNC: 1.86 MIU/L (ref 0.3–5)
VLDLC SERPL CALC-MCNC: NORMAL MG/DL (ref 1–30)
WBC # BLD: 4 K/UL (ref 3.5–11.3)
WBC # BLD: ABNORMAL 10*3/UL

## 2021-07-06 PROCEDURE — 80053 COMPREHEN METABOLIC PANEL: CPT

## 2021-07-06 PROCEDURE — 84443 ASSAY THYROID STIM HORMONE: CPT

## 2021-07-06 PROCEDURE — 80061 LIPID PANEL: CPT

## 2021-07-06 PROCEDURE — 84481 FREE ASSAY (FT-3): CPT

## 2021-07-06 PROCEDURE — 85045 AUTOMATED RETICULOCYTE COUNT: CPT

## 2021-07-06 PROCEDURE — 84439 ASSAY OF FREE THYROXINE: CPT

## 2021-07-06 PROCEDURE — 85025 COMPLETE CBC W/AUTO DIFF WBC: CPT

## 2021-07-06 PROCEDURE — 36415 COLL VENOUS BLD VENIPUNCTURE: CPT

## 2021-07-07 LAB
PATHOLOGIST REVIEW: NORMAL
SURGICAL PATHOLOGY REPORT: NORMAL

## 2021-08-09 ENCOUNTER — PATIENT MESSAGE (OUTPATIENT)
Dept: FAMILY MEDICINE CLINIC | Age: 46
End: 2021-08-09

## 2021-08-09 RX ORDER — LEVOTHYROXINE SODIUM 88 UG/1
88 TABLET ORAL DAILY
Qty: 90 TABLET | Refills: 0 | Status: SHIPPED | OUTPATIENT
Start: 2021-08-09 | End: 2022-08-17

## 2021-08-09 NOTE — TELEPHONE ENCOUNTER
From: Tin Robles  To: Hector Iglesias MD  Sent: 8/9/2021 10:06 AM EDT  Subject: Prescription Question    Is there anyway Dr. Amaury Gomez can prescribe me 90 pill supply of my Synthroid medicine instead of 30 day supply? This is what I normally have.  Thank you ,  Ludin Carrillo

## 2022-03-03 ENCOUNTER — HOSPITAL ENCOUNTER (OUTPATIENT)
Facility: CLINIC | Age: 47
Discharge: HOME | End: 2022-03-03
Payer: COMMERCIAL

## 2022-03-03 LAB
HAV IGM SER IA-ACNC: NONREACTIVE
HEPATITIS B CORE IGM ANTIBODY: NONREACTIVE
HEPATITIS B SURFACE ANTIGEN: NONREACTIVE
HEPATITIS C ANTIBODY: NONREACTIVE

## 2022-03-03 PROCEDURE — 36415 COLL VENOUS BLD VENIPUNCTURE: CPT

## 2022-03-03 PROCEDURE — 86480 TB TEST CELL IMMUN MEASURE: CPT

## 2022-03-03 PROCEDURE — 80074 ACUTE HEPATITIS PANEL: CPT

## 2022-03-06 LAB
QUANTI TB GOLD PLUS: NEGATIVE
QUANTI TB1 MINUS NIL: 0 IU/ML (ref 0–0.34)
QUANTI TB2 MINUS NIL: 0 IU/ML (ref 0–0.34)
QUANTIFERON MITOGEN: >10 IU/ML
QUANTIFERON NIL: 0.02 IU/ML

## 2022-08-17 RX ORDER — LEVOTHYROXINE SODIUM 88 UG/1
TABLET ORAL
Qty: 90 TABLET | Refills: 0 | Status: SHIPPED | OUTPATIENT
Start: 2022-08-17

## 2022-11-14 RX ORDER — LEVOTHYROXINE SODIUM 88 UG/1
TABLET ORAL
Qty: 90 TABLET | Refills: 0 | Status: CANCELLED | OUTPATIENT
Start: 2022-11-14

## 2022-11-14 RX ORDER — LEVOTHYROXINE SODIUM 88 UG/1
TABLET ORAL
Qty: 90 TABLET | Refills: 0 | OUTPATIENT
Start: 2022-11-14

## 2022-11-15 ENCOUNTER — OFFICE VISIT (OUTPATIENT)
Dept: FAMILY MEDICINE CLINIC | Age: 47
End: 2022-11-15
Payer: COMMERCIAL

## 2022-11-15 DIAGNOSIS — Z80.0 FAMILY HISTORY OF COLON CANCER: ICD-10-CM

## 2022-11-15 DIAGNOSIS — D70.9 NEUTROPENIA, UNSPECIFIED TYPE (HCC): ICD-10-CM

## 2022-11-15 DIAGNOSIS — E03.9 HYPOTHYROIDISM, UNSPECIFIED TYPE: ICD-10-CM

## 2022-11-15 DIAGNOSIS — N95.1 PERIMENOPAUSAL: ICD-10-CM

## 2022-11-15 DIAGNOSIS — Z13.220 SCREENING CHOLESTEROL LEVEL: ICD-10-CM

## 2022-11-15 DIAGNOSIS — F41.9 ANXIETY: ICD-10-CM

## 2022-11-15 DIAGNOSIS — Z12.31 ENCOUNTER FOR SCREENING MAMMOGRAM FOR HIGH-RISK PATIENT: ICD-10-CM

## 2022-11-15 DIAGNOSIS — Z23 NEED FOR INFLUENZA VACCINATION: ICD-10-CM

## 2022-11-15 DIAGNOSIS — Z00.00 ANNUAL PHYSICAL EXAM: Primary | ICD-10-CM

## 2022-11-15 PROCEDURE — 99213 OFFICE O/P EST LOW 20 MIN: CPT | Performed by: FAMILY MEDICINE

## 2022-11-15 PROCEDURE — 90471 IMMUNIZATION ADMIN: CPT | Performed by: FAMILY MEDICINE

## 2022-11-15 PROCEDURE — 99396 PREV VISIT EST AGE 40-64: CPT | Performed by: FAMILY MEDICINE

## 2022-11-15 PROCEDURE — 90674 CCIIV4 VAC NO PRSV 0.5 ML IM: CPT | Performed by: FAMILY MEDICINE

## 2022-11-15 RX ORDER — ALPRAZOLAM 0.25 MG/1
TABLET ORAL
Qty: 30 TABLET | Refills: 0 | Status: SHIPPED | OUTPATIENT
Start: 2022-11-15 | End: 2022-12-15

## 2022-11-15 RX ORDER — TRIAMCINOLONE ACETONIDE 1 MG/G
CREAM TOPICAL
COMMUNITY
Start: 2022-11-14

## 2022-11-15 RX ORDER — TRETINOIN 0.5 MG/G
CREAM TOPICAL
COMMUNITY
Start: 2022-11-14

## 2022-11-15 SDOH — ECONOMIC STABILITY: FOOD INSECURITY: WITHIN THE PAST 12 MONTHS, THE FOOD YOU BOUGHT JUST DIDN'T LAST AND YOU DIDN'T HAVE MONEY TO GET MORE.: NEVER TRUE

## 2022-11-15 SDOH — ECONOMIC STABILITY: FOOD INSECURITY: WITHIN THE PAST 12 MONTHS, YOU WORRIED THAT YOUR FOOD WOULD RUN OUT BEFORE YOU GOT MONEY TO BUY MORE.: NEVER TRUE

## 2022-11-15 ASSESSMENT — ENCOUNTER SYMPTOMS
BACK PAIN: 0
COUGH: 0
SORE THROAT: 0
ABDOMINAL PAIN: 0
ABDOMINAL DISTENTION: 0
SHORTNESS OF BREATH: 0
VOMITING: 0
CONSTIPATION: 0
NAUSEA: 0
RHINORRHEA: 0
CHEST TIGHTNESS: 0
DIARRHEA: 0

## 2022-11-15 ASSESSMENT — PATIENT HEALTH QUESTIONNAIRE - PHQ9
SUM OF ALL RESPONSES TO PHQ QUESTIONS 1-9: 1
2. FEELING DOWN, DEPRESSED OR HOPELESS: 1
1. LITTLE INTEREST OR PLEASURE IN DOING THINGS: 0
SUM OF ALL RESPONSES TO PHQ QUESTIONS 1-9: 1
SUM OF ALL RESPONSES TO PHQ9 QUESTIONS 1 & 2: 1

## 2022-11-15 ASSESSMENT — SOCIAL DETERMINANTS OF HEALTH (SDOH): HOW HARD IS IT FOR YOU TO PAY FOR THE VERY BASICS LIKE FOOD, HOUSING, MEDICAL CARE, AND HEATING?: NOT HARD AT ALL

## 2022-11-15 NOTE — PROGRESS NOTES
Ofelia Rosas MD  [address]  [id number]  [place] , [facility] [address]  [address] [address]  Dept: [phone]  Dept Fax: [phone]     Patient ID: Nelson Dyer is a 41 y.o. female. HPI    Nelson Dyer is a 41 y.o. female who presents to the office today for chronic medical problems and medication refills. Today, the patient complains of nothing. Pt denies any N/V/D/C or abdominal pain. Pt denies any fever or chills. Pt today denies any HA, chest pain, or SOB. Pt has been feeling a little anxious and tearful as she just lost her 54year old brother to colon cancer with liver mets and she states she is having a hard time dealing with it. She did have a colonoscopy and it was normal. Pt has not had a period with being on the [facility]. Pt is still working out daily and really does want to cut back, but mentally she feels she needs to do something daily. The patient's past medical, surgical, social, and family history as well as her current medications and allergies were reviewed as documented in today's encounter.       Current Outpatient Medications on File Prior to Visit   Medication Sig Dispense Refill    tretinoin (RETIN-A) 0.05 % cream Use as directed per derm      triamcinolone (KENALOG) 0.1 % cream Use as directed per derm      levothyroxine (SYNTHROID) 88 MCG tablet TAKE ONE TABLET BY MOUTH DAILY 90 tablet 0    SUMAtriptan (IMITREX) 6 MG/0.5ML SOLN injection Inject 0.5 mLs into the skin once as needed for Migraine 0.5 mL 3    vitamin C (ASCORBIC ACID) 500 MG tablet Take 1 tablet by mouth daily      Biotin 2.5 MG CAPS Take 2,500 mcg by mouth daily      Cyanocobalamin 1000 MCG SUBL Take 1 tablet by mouth daily      magnesium (MAGNESIUM-OXIDE) 250 MG TABS tablet Take 250 mg by mouth daily      Multiple Vitamins-Minerals (THERAPEUTIC MULTIVITAMIN-MINERALS) tablet Take 1 tablet by mouth daily      Cholecalciferol (VITAMIN D) 125 MCG (5000 UT) CAPS Take 1 capsule by mouth daily      levonorgestrel (MIRENA, 52 MG,) IUD 52 mg 1 each by Intrauterine route once       No current facility-administered medications on file prior to visit. Subjective:     Review of Systems   Constitutional:  Negative for appetite change, fatigue and fever. HENT:  Negative for congestion, ear pain, rhinorrhea and sore throat. Respiratory:  Negative for cough, chest tightness and shortness of breath. Cardiovascular:  Negative for chest pain and palpitations. Gastrointestinal:  Negative for abdominal distention, abdominal pain, constipation, diarrhea, nausea and vomiting. Genitourinary:  Negative for difficulty urinating and dysuria. Musculoskeletal:  Negative for arthralgias, back pain and myalgias. Skin:  Negative for rash. Neurological:  Negative for dizziness, weakness, light-headedness and headaches. Hematological:  Negative for adenopathy. Psychiatric/Behavioral:  Negative for behavioral problems and sleep disturbance. The patient is not nervous/anxious. Objective:     Physical Exam  Vitals reviewed. Constitutional:       General: She is not in acute distress. Appearance: She is well-developed. HENT:      Head: Normocephalic and atraumatic. Right Ear: External ear normal.      Left Ear: External ear normal.      Nose: Nose normal.      Mouth/Throat:      Pharynx: No oropharyngeal exudate. Eyes:      Conjunctiva/sclera: Conjunctivae normal.      Pupils: Pupils are equal, round, and reactive to light. Cardiovascular:      Rate and Rhythm: Normal rate and regular rhythm. Heart sounds: Normal heart sounds. No murmur heard. Pulmonary:      Effort: Pulmonary effort is normal. No respiratory distress. Breath sounds: Normal breath sounds. No wheezing. Chest:      Chest wall: No tenderness. Abdominal:      General: Bowel sounds are normal. There is no distension. Palpations: Abdomen is soft. There is no mass.       Tenderness: There is no abdominal tenderness. Musculoskeletal:         General: No tenderness. Normal range of motion. Cervical back: Normal range of motion. Lymphadenopathy:      Cervical: No cervical adenopathy. Skin:     Findings: No rash. Neurological:      Mental Status: She is alert and oriented to person, place, and time. Deep Tendon Reflexes: Reflexes are normal and symmetric. Psychiatric:         Behavior: Behavior normal.       Assessment:      Diagnosis Orders   1. Annual physical exam  Comprehensive Metabolic Panel    Lipid Panel    TSH    [facility] DIGITAL SCREEN W OR WO CAD BILATERAL    CBC with Auto Differential      2. Hypothyroidism, unspecified type  TSH    T4, Free    T3, Free      3. Neutropenia, unspecified type ([address]  CBC with Auto Differential      4. Screening cholesterol level  Lipid Panel      5. Need for influenza vaccination  Influenza, FLUCELVAX, (age 10 mo+), IM, Preservative Free, 0.5 mL      6. Encounter for screening mammogram for high-risk patient  [facility] DIGITAL SCREEN W OR WO CAD BILATERAL      7. Perimenopausal  TSH    T4, Free    Follicle Stimulating Hormone    Luteinizing Hormone    Progesterone    Estrone    Estradiol      8. Anxiety  ALPRAZolam (XANAX) 0.25 MG tablet      9.  Family history of colon cancer      brother just passed away             Plan:     Orders Placed This Encounter   Procedures    [facility] DIGITAL SCREEN W OR WO CAD BILATERAL     Standing Status:   Future     Standing Expiration Date:   1/15/2024     Order Specific Question:   Reason for exam:     Answer:   screening    Influenza, FLUCELVAX, (age 10 mo+), IM, Preservative Free, 0.5 mL    Comprehensive Metabolic Panel     Standing Status:   Future     Standing Expiration Date:   11/15/2023    Lipid Panel     Standing Status:   Future     Standing Expiration Date:   11/15/2023     Order Specific Question:   Is Patient Fasting?/# of Hours     Answer:   8-10 Hours    TSH     Standing Status:   Future     Standing Expiration Date:   11/15/2023    T4, Free     Standing Status:   Future     Standing Expiration Date:   35/15/4902    Follicle Stimulating Hormone     Standing Status:   Future     Standing Expiration Date:   5/15/2023    Luteinizing Hormone     Standing Status:   Future     Standing Expiration Date:   5/15/2023    Progesterone     Standing Status:   Future     Standing Expiration Date:   11/15/2023    Estrone     Standing Status:   Future     Standing Expiration Date:   11/15/2023    Estradiol     Standing Status:   Future     Standing Expiration Date:   11/15/2023    T3, Free     Standing Status:   Future     Standing Expiration Date:   11/15/2023    CBC with Auto Differential     Standing Status:   Future     Standing Expiration Date:   11/15/2023      Orders Placed This Encounter   Medications    ALPRAZolam (XANAX) 0.25 MG tablet     Sig: Take every 12 hours prn anxiety     Dispense:  30 tablet     Refill:  0       Will cont with current treatment as pt is currently stable on current treatment     Will cont with a healthy diet and staying active     Will cont with Synthroid 88mcg daily and check thyroid studies    Will start her on Xanax 0.25mg to take prn    I did state with her family history of colon cancer in her brother, she will need to have one every 5 years - she did just have one and it was normal    [facility] report was completed and assessed on this patient and patient was found to be compliant with narcotic prescription policies. I did stress to pt to try and take a day off from exercising. I did state her body does need the rest and I did state she may feel fatigued at first on the days she does nothing, but once she starts taking a day off she will live for those days off     Get labs now and will call with results    Rest of systems unchanged, continue current treatments. Medications, labs, diagnostic studies, consultations and follow-up as documented in this encounter.  Rest of systems unchanged, continue current treatments    I have spent 30 minutes face to face with the patient more than 50% of this time was spent counseling and coordinating care. Ofelia Sutton MD

## 2022-11-16 RX ORDER — LEVOTHYROXINE SODIUM 88 UG/1
TABLET ORAL
Qty: 90 TABLET | Refills: 1 | Status: SHIPPED | OUTPATIENT
Start: 2022-11-16

## 2022-11-17 LAB — PAP SMEAR, EXTERNAL: NORMAL

## 2022-11-19 ENCOUNTER — HOSPITAL ENCOUNTER (OUTPATIENT)
Facility: CLINIC | Age: 47
Discharge: HOME | End: 2022-11-19
Payer: COMMERCIAL

## 2022-11-19 DIAGNOSIS — N95.1 PERIMENOPAUSAL: ICD-10-CM

## 2022-11-19 DIAGNOSIS — Z13.220 SCREENING CHOLESTEROL LEVEL: ICD-10-CM

## 2022-11-19 DIAGNOSIS — E03.9 HYPOTHYROIDISM, UNSPECIFIED TYPE: ICD-10-CM

## 2022-11-19 DIAGNOSIS — Z00.00 ANNUAL PHYSICAL EXAM: ICD-10-CM

## 2022-11-19 DIAGNOSIS — D70.9 NEUTROPENIA, UNSPECIFIED TYPE (HCC): ICD-10-CM

## 2022-11-19 LAB
ABSOLUTE EOS #: 0.12 K/UL (ref 0–0.44)
ABSOLUTE IMMATURE GRANULOCYTE: <0.03 K/UL (ref 0–0.3)
ABSOLUTE LYMPH #: 0.97 K/UL (ref 1.1–3.7)
ABSOLUTE MONO #: 0.32 K/UL (ref 0.1–1.2)
BASOPHILS # BLD: 1 % (ref 0–2)
BASOPHILS ABSOLUTE: 0.03 K/UL (ref 0–0.2)
EOSINOPHILS RELATIVE PERCENT: 4 % (ref 1–4)
HCT VFR BLD CALC: 42.6 % (ref 36.3–47.1)
HEMOGLOBIN: 13.8 G/DL (ref 11.9–15.1)
IMMATURE GRANULOCYTES: 0 %
LYMPHOCYTES # BLD: 34 % (ref 24–43)
MCH RBC QN AUTO: 32.6 PG (ref 25.2–33.5)
MCHC RBC AUTO-ENTMCNC: 32.4 G/DL (ref 28.4–34.8)
MCV RBC AUTO: 100.7 FL (ref 82.6–102.9)
MONOCYTES # BLD: 11 % (ref 3–12)
NRBC AUTOMATED: 0 PER 100 WBC
PDW BLD-RTO: 13 % (ref 11.8–14.4)
PLATELET # BLD: 177 K/UL (ref 138–453)
PMV BLD AUTO: 11.3 FL (ref 8.1–13.5)
RBC # BLD: 4.23 M/UL (ref 3.95–5.11)
SEG NEUTROPHILS: 50 % (ref 36–65)
SEGMENTED NEUTROPHILS ABSOLUTE COUNT: 1.41 K/UL (ref 1.5–8.1)
WBC # BLD: 2.9 K/UL (ref 3.5–11.3)

## 2022-11-19 PROCEDURE — 84443 ASSAY THYROID STIM HORMONE: CPT

## 2022-11-19 PROCEDURE — 84144 ASSAY OF PROGESTERONE: CPT

## 2022-11-19 PROCEDURE — 85025 COMPLETE CBC W/AUTO DIFF WBC: CPT

## 2022-11-19 PROCEDURE — 84481 FREE ASSAY (FT-3): CPT

## 2022-11-19 PROCEDURE — 36415 COLL VENOUS BLD VENIPUNCTURE: CPT

## 2022-11-19 PROCEDURE — 84439 ASSAY OF FREE THYROXINE: CPT

## 2022-11-19 PROCEDURE — 80061 LIPID PANEL: CPT

## 2022-11-19 PROCEDURE — 82679 ASSAY OF ESTRONE: CPT

## 2022-11-19 PROCEDURE — 83001 ASSAY OF GONADOTROPIN (FSH): CPT

## 2022-11-19 PROCEDURE — 83002 ASSAY OF GONADOTROPIN (LH): CPT

## 2022-11-19 PROCEDURE — 80053 COMPREHEN METABOLIC PANEL: CPT

## 2022-11-19 PROCEDURE — 82670 ASSAY OF TOTAL ESTRADIOL: CPT

## 2022-11-20 LAB
ALBUMIN SERPL-MCNC: 4.3 G/DL (ref 3.5–5.2)
ALBUMIN/GLOBULIN RATIO: 1.7 (ref 1–2.5)
ALP BLD-CCNC: 60 U/L (ref 35–104)
ALT SERPL-CCNC: 21 U/L (ref 5–33)
ANION GAP SERPL CALCULATED.3IONS-SCNC: 10 MMOL/L (ref 9–17)
AST SERPL-CCNC: 33 U/L
BILIRUB SERPL-MCNC: 0.5 MG/DL (ref 0.3–1.2)
BUN BLDV-MCNC: 23 MG/DL (ref 6–20)
CALCIUM SERPL-MCNC: 9.5 MG/DL (ref 8.6–10.4)
CHLORIDE BLD-SCNC: 102 MMOL/L (ref 98–107)
CHOLESTEROL/HDL RATIO: 2.8
CHOLESTEROL: 191 MG/DL
CO2: 24 MMOL/L (ref 20–31)
CREAT SERPL-MCNC: 0.75 MG/DL (ref 0.5–0.9)
ESTRADIOL LEVEL: 15.2 PG/ML (ref 27–314)
FOLLICLE STIMULATING HORMONE: 7.3 MIU/ML (ref 1.7–21.5)
GFR SERPL CREATININE-BSD FRML MDRD: >60 ML/MIN/1.73M2
GLUCOSE BLD-MCNC: 90 MG/DL (ref 70–99)
HDLC SERPL-MCNC: 69 MG/DL
LDL CHOLESTEROL: 111 MG/DL (ref 0–130)
LH: 4.7 MIU/ML (ref 1–95.6)
POTASSIUM SERPL-SCNC: 4.7 MMOL/L (ref 3.7–5.3)
PROGESTERONE LEVEL: 0.32 NG/ML
SODIUM BLD-SCNC: 136 MMOL/L (ref 135–144)
T3 FREE: 1.67 PG/ML (ref 2.02–4.43)
THYROXINE, FREE: 1.04 NG/DL (ref 0.93–1.7)
TOTAL PROTEIN: 6.8 G/DL (ref 6.4–8.3)
TRIGL SERPL-MCNC: 54 MG/DL
TSH SERPL DL<=0.05 MIU/L-ACNC: 1.86 UIU/ML (ref 0.3–5)

## 2022-11-25 LAB — ESTRONE: 29.8 PG/ML

## 2023-04-07 RX ORDER — LEVOTHYROXINE SODIUM 88 UG/1
TABLET ORAL
Qty: 90 TABLET | Refills: 0 | Status: SHIPPED | OUTPATIENT
Start: 2023-04-07

## 2023-05-13 ENCOUNTER — HOSPITAL ENCOUNTER (OUTPATIENT)
Facility: CLINIC | Age: 48
Discharge: HOME | End: 2023-05-13
Payer: COMMERCIAL

## 2023-05-13 DIAGNOSIS — E03.9 HYPOTHYROIDISM, UNSPECIFIED TYPE: ICD-10-CM

## 2023-05-13 DIAGNOSIS — D70.9 NEUTROPENIA, UNSPECIFIED TYPE (HCC): ICD-10-CM

## 2023-05-13 DIAGNOSIS — N95.1 PERIMENOPAUSAL: ICD-10-CM

## 2023-05-13 LAB
ABSOLUTE EOS #: 0.1 K/UL (ref 0–0.44)
ABSOLUTE IMMATURE GRANULOCYTE: <0.03 K/UL (ref 0–0.3)
ABSOLUTE LYMPH #: 1.08 K/UL (ref 1.1–3.7)
ABSOLUTE MONO #: 0.33 K/UL (ref 0.1–1.2)
ALBUMIN SERPL-MCNC: 4.2 G/DL (ref 3.5–5.2)
ALBUMIN/GLOBULIN RATIO: 1.8 (ref 1–2.5)
ALP SERPL-CCNC: 69 U/L (ref 35–104)
ALT SERPL-CCNC: 25 U/L (ref 5–33)
ANION GAP SERPL CALCULATED.3IONS-SCNC: 10 MMOL/L (ref 9–17)
AST SERPL-CCNC: 35 U/L
BASOPHILS # BLD: 1 % (ref 0–2)
BASOPHILS ABSOLUTE: 0.03 K/UL (ref 0–0.2)
BILIRUB SERPL-MCNC: 0.5 MG/DL (ref 0.3–1.2)
BUN SERPL-MCNC: 22 MG/DL (ref 6–20)
CALCIUM SERPL-MCNC: 9.6 MG/DL (ref 8.6–10.4)
CHLORIDE SERPL-SCNC: 107 MMOL/L (ref 98–107)
CO2 SERPL-SCNC: 25 MMOL/L (ref 20–31)
CREAT SERPL-MCNC: 0.86 MG/DL (ref 0.5–0.9)
EOSINOPHILS RELATIVE PERCENT: 3 % (ref 1–4)
GFR SERPL CREATININE-BSD FRML MDRD: >60 ML/MIN/1.73M2
GLUCOSE SERPL-MCNC: 101 MG/DL (ref 70–99)
HCT VFR BLD AUTO: 46.3 % (ref 36.3–47.1)
HGB BLD-MCNC: 14.6 G/DL (ref 11.9–15.1)
IMMATURE GRANULOCYTES: 0 %
LYMPHOCYTES # BLD: 31 % (ref 24–43)
MCH RBC QN AUTO: 32.6 PG (ref 25.2–33.5)
MCHC RBC AUTO-ENTMCNC: 31.5 G/DL (ref 28.4–34.8)
MCV RBC AUTO: 103.3 FL (ref 82.6–102.9)
MONOCYTES # BLD: 9 % (ref 3–12)
NRBC AUTOMATED: 0 PER 100 WBC
PDW BLD-RTO: 12.8 % (ref 11.8–14.4)
PLATELET # BLD AUTO: 198 K/UL (ref 138–453)
PMV BLD AUTO: 11.2 FL (ref 8.1–13.5)
POTASSIUM SERPL-SCNC: 4.9 MMOL/L (ref 3.7–5.3)
PROT SERPL-MCNC: 6.6 G/DL (ref 6.4–8.3)
RBC # BLD: 4.48 M/UL (ref 3.95–5.11)
RBC # BLD: ABNORMAL 10*6/UL
SEG NEUTROPHILS: 56 % (ref 36–65)
SEGMENTED NEUTROPHILS ABSOLUTE COUNT: 1.98 K/UL (ref 1.5–8.1)
SODIUM SERPL-SCNC: 142 MMOL/L (ref 135–144)
T4 FREE SERPL-MCNC: 1.1 NG/DL (ref 0.9–1.7)
TSH SERPL-ACNC: 1.25 UIU/ML (ref 0.3–5)
WBC # BLD AUTO: 3.5 K/UL (ref 3.5–11.3)

## 2023-05-13 PROCEDURE — 84439 ASSAY OF FREE THYROXINE: CPT

## 2023-05-13 PROCEDURE — 85025 COMPLETE CBC W/AUTO DIFF WBC: CPT

## 2023-05-13 PROCEDURE — 80053 COMPREHEN METABOLIC PANEL: CPT

## 2023-05-13 PROCEDURE — 84443 ASSAY THYROID STIM HORMONE: CPT

## 2023-05-13 PROCEDURE — 84481 FREE ASSAY (FT-3): CPT

## 2023-05-13 PROCEDURE — 36415 COLL VENOUS BLD VENIPUNCTURE: CPT

## 2023-05-14 LAB — T3FREE SERPL-MCNC: 1.73 PG/ML (ref 2.02–4.43)

## 2023-05-15 LAB — SURGICAL PATHOLOGY REPORT: NORMAL

## 2023-05-16 LAB — PATH REV BLD -IMP: NORMAL

## 2023-05-23 RX ORDER — LEVOTHYROXINE SODIUM 88 UG/1
TABLET ORAL
Qty: 90 TABLET | Refills: 0 | Status: SHIPPED | OUTPATIENT
Start: 2023-05-23

## 2023-07-05 RX ORDER — LEVOTHYROXINE SODIUM 88 UG/1
TABLET ORAL
Qty: 90 TABLET | Refills: 3 | OUTPATIENT
Start: 2023-07-05

## 2023-08-31 RX ORDER — LEVOTHYROXINE SODIUM 88 UG/1
TABLET ORAL
Qty: 30 TABLET | Refills: 0 | Status: SHIPPED | OUTPATIENT
Start: 2023-08-31

## 2023-08-31 NOTE — TELEPHONE ENCOUNTER
Last Visit Date: 11/15/2022   Next Visit Date: 11/21/2023     Patient informed that a 30 day supply will be sent and to schedule an appointment within 30 days.

## 2023-12-06 RX ORDER — LEVOTHYROXINE SODIUM 88 UG/1
TABLET ORAL
Qty: 30 TABLET | Refills: 0 | Status: SHIPPED | OUTPATIENT
Start: 2023-12-06

## 2023-12-14 RX ORDER — LEVOTHYROXINE SODIUM 88 UG/1
TABLET ORAL
Qty: 30 TABLET | Refills: 0 | OUTPATIENT
Start: 2023-12-14

## 2023-12-18 RX ORDER — LEVOTHYROXINE SODIUM 88 UG/1
TABLET ORAL
Qty: 30 TABLET | Refills: 0 | OUTPATIENT
Start: 2023-12-18

## 2023-12-26 RX ORDER — LEVOTHYROXINE SODIUM 88 UG/1
TABLET ORAL
Qty: 30 TABLET | Refills: 1 | Status: SHIPPED | OUTPATIENT
Start: 2023-12-26

## 2024-01-17 ENCOUNTER — TELEPHONE (OUTPATIENT)
Dept: FAMILY MEDICINE CLINIC | Age: 49
End: 2024-01-17

## 2024-01-17 NOTE — TELEPHONE ENCOUNTER
Pt went to her dentist yesterday for clenching her teeth at night. She uses a mouth guard at night but has almost bitten through the guard. Her dentist recommended botox but he cannot submit a \"medical code\" for the botox to be covered. Pts ins said to ask her PCP for a referral with a covering medical code to somewhere that could perform this for the pt.

## 2024-01-17 NOTE — TELEPHONE ENCOUNTER
Please have her find a provider who does Botox for TMJ. Once we know the provider, we can place a referral with the specific diagnosis code.

## 2024-01-30 RX ORDER — LEVOTHYROXINE SODIUM 88 UG/1
TABLET ORAL
Qty: 60 TABLET | Refills: 5 | OUTPATIENT
Start: 2024-01-30

## 2024-02-02 SDOH — ECONOMIC STABILITY: FOOD INSECURITY: WITHIN THE PAST 12 MONTHS, THE FOOD YOU BOUGHT JUST DIDN'T LAST AND YOU DIDN'T HAVE MONEY TO GET MORE.: NEVER TRUE

## 2024-02-02 SDOH — ECONOMIC STABILITY: HOUSING INSECURITY
IN THE LAST 12 MONTHS, WAS THERE A TIME WHEN YOU DID NOT HAVE A STEADY PLACE TO SLEEP OR SLEPT IN A SHELTER (INCLUDING NOW)?: NO

## 2024-02-02 SDOH — ECONOMIC STABILITY: INCOME INSECURITY: HOW HARD IS IT FOR YOU TO PAY FOR THE VERY BASICS LIKE FOOD, HOUSING, MEDICAL CARE, AND HEATING?: NOT HARD AT ALL

## 2024-02-02 SDOH — ECONOMIC STABILITY: TRANSPORTATION INSECURITY
IN THE PAST 12 MONTHS, HAS LACK OF TRANSPORTATION KEPT YOU FROM MEETINGS, WORK, OR FROM GETTING THINGS NEEDED FOR DAILY LIVING?: NO

## 2024-02-02 SDOH — ECONOMIC STABILITY: FOOD INSECURITY: WITHIN THE PAST 12 MONTHS, YOU WORRIED THAT YOUR FOOD WOULD RUN OUT BEFORE YOU GOT MONEY TO BUY MORE.: NEVER TRUE

## 2024-02-02 ASSESSMENT — PATIENT HEALTH QUESTIONNAIRE - PHQ9
2. FEELING DOWN, DEPRESSED OR HOPELESS: 0
SUM OF ALL RESPONSES TO PHQ9 QUESTIONS 1 & 2: 0
1. LITTLE INTEREST OR PLEASURE IN DOING THINGS: 0
SUM OF ALL RESPONSES TO PHQ9 QUESTIONS 1 & 2: 0
SUM OF ALL RESPONSES TO PHQ QUESTIONS 1-9: 0
2. FEELING DOWN, DEPRESSED OR HOPELESS: NOT AT ALL
SUM OF ALL RESPONSES TO PHQ QUESTIONS 1-9: 0
1. LITTLE INTEREST OR PLEASURE IN DOING THINGS: NOT AT ALL

## 2024-02-05 ENCOUNTER — OFFICE VISIT (OUTPATIENT)
Dept: FAMILY MEDICINE CLINIC | Age: 49
End: 2024-02-05
Payer: COMMERCIAL

## 2024-02-05 VITALS
RESPIRATION RATE: 16 BRPM | SYSTOLIC BLOOD PRESSURE: 112 MMHG | HEIGHT: 70 IN | OXYGEN SATURATION: 99 % | WEIGHT: 159 LBS | DIASTOLIC BLOOD PRESSURE: 74 MMHG | HEART RATE: 48 BPM | TEMPERATURE: 98.2 F | BODY MASS INDEX: 22.76 KG/M2

## 2024-02-05 DIAGNOSIS — G43.909 MIGRAINE WITHOUT STATUS MIGRAINOSUS, NOT INTRACTABLE, UNSPECIFIED MIGRAINE TYPE: ICD-10-CM

## 2024-02-05 DIAGNOSIS — N95.1 PERIMENOPAUSAL: ICD-10-CM

## 2024-02-05 DIAGNOSIS — Z80.0 FAMILY HISTORY OF COLON CANCER: ICD-10-CM

## 2024-02-05 DIAGNOSIS — Z12.31 ENCOUNTER FOR SCREENING MAMMOGRAM FOR HIGH-RISK PATIENT: ICD-10-CM

## 2024-02-05 DIAGNOSIS — R00.1 BRADYCARDIA: ICD-10-CM

## 2024-02-05 DIAGNOSIS — E03.9 HYPOTHYROIDISM, UNSPECIFIED TYPE: Primary | ICD-10-CM

## 2024-02-05 DIAGNOSIS — D70.9 NEUTROPENIA, UNSPECIFIED TYPE (HCC): ICD-10-CM

## 2024-02-05 DIAGNOSIS — Z13.220 SCREENING CHOLESTEROL LEVEL: ICD-10-CM

## 2024-02-05 PROCEDURE — 99214 OFFICE O/P EST MOD 30 MIN: CPT | Performed by: FAMILY MEDICINE

## 2024-02-05 RX ORDER — LEVOTHYROXINE SODIUM 88 UG/1
TABLET ORAL
Qty: 90 TABLET | Refills: 3 | Status: SHIPPED | OUTPATIENT
Start: 2024-02-05

## 2024-02-05 RX ORDER — SUMATRIPTAN 6 MG/.5ML
6 INJECTION, SOLUTION SUBCUTANEOUS
Qty: 5 ML | Refills: 3 | Status: SHIPPED | OUTPATIENT
Start: 2024-02-05 | End: 2024-02-05

## 2024-02-05 ASSESSMENT — ENCOUNTER SYMPTOMS
SHORTNESS OF BREATH: 0
CONSTIPATION: 0
ABDOMINAL DISTENTION: 0
VOMITING: 0
COUGH: 0
SORE THROAT: 0
RHINORRHEA: 0
BACK PAIN: 0
NAUSEA: 0
CHEST TIGHTNESS: 0
DIARRHEA: 0
ABDOMINAL PAIN: 0

## 2024-02-05 NOTE — PROGRESS NOTES
Expiration Date:   8/5/2024    Luteinizing Hormone     Standing Status:   Future     Standing Expiration Date:   8/5/2024    Progesterone     Standing Status:   Future     Standing Expiration Date:   2/5/2025      Orders Placed This Encounter   Medications    levothyroxine (SYNTHROID) 88 MCG tablet     Sig: TAKE 1 TABLET BY MOUTH DAILY     Dispense:  90 tablet     Refill:  3     Requesting 1 year supply    SUMAtriptan (IMITREX) 6 MG/0.5ML SOLN injection     Sig: Inject 0.5 mLs into the skin once as needed for Migraine     Dispense:  5 mL     Refill:  3       Will cont with current treatment as pt is currently stable on current treatment     Will cont with a healthy diet and staying active     Will cont with Synthroid 88mcg daily and check thyroid studies    Will cont with the Imitrex prn for her acute migraines    Will monitor her HR, but I did state we do see a low HR in [employer] people and since she is asymptomatic will monitor    Will check her hormone levels - she has not had a period in years with being on the IUD    Pt did have a colonoscopy in 2020, but with her family history of colon cancer in her brother, will repeat in 2025    Get labs now and will call with results    Rest of systems unchanged, continue current treatments.    Medications, labs, diagnostic studies, consultations and follow-up as documented in this encounter. Rest of systems unchanged, continue current treatments    I have spent 30 minutes face to face with the patient more than 50% of this time was spent counseling and coordinating care.    Ofelia Guzman MD

## 2024-02-15 DIAGNOSIS — E03.9 HYPOTHYROIDISM, UNSPECIFIED TYPE: ICD-10-CM

## 2024-02-16 RX ORDER — LEVOTHYROXINE SODIUM 88 UG/1
TABLET ORAL
Qty: 90 TABLET | Refills: 1 | Status: SHIPPED | OUTPATIENT
Start: 2024-02-16

## 2024-03-02 ENCOUNTER — HOSPITAL ENCOUNTER (OUTPATIENT)
Facility: CLINIC | Age: 49
Discharge: HOME | End: 2024-03-02
Payer: COMMERCIAL

## 2024-03-02 DIAGNOSIS — Z13.220 SCREENING CHOLESTEROL LEVEL: ICD-10-CM

## 2024-03-02 DIAGNOSIS — E03.9 HYPOTHYROIDISM, UNSPECIFIED TYPE: ICD-10-CM

## 2024-03-02 DIAGNOSIS — D70.9 NEUTROPENIA, UNSPECIFIED TYPE (HCC): ICD-10-CM

## 2024-03-02 DIAGNOSIS — N95.1 PERIMENOPAUSAL: ICD-10-CM

## 2024-03-02 LAB
ALBUMIN SERPL-MCNC: 4.3 G/DL (ref 3.5–5.2)
ALBUMIN/GLOB SERPL: 1.7 {RATIO} (ref 1–2.5)
ALP SERPL-CCNC: 73 U/L (ref 35–104)
ALT SERPL-CCNC: 23 U/L (ref 5–33)
ANION GAP SERPL CALCULATED.3IONS-SCNC: 10 MMOL/L (ref 9–17)
AST SERPL-CCNC: 28 U/L
BASOPHILS # BLD: 0.03 K/UL (ref 0–0.2)
BASOPHILS NFR BLD: 1 % (ref 0–2)
BILIRUB SERPL-MCNC: 0.4 MG/DL (ref 0.3–1.2)
BUN SERPL-MCNC: 24 MG/DL (ref 6–20)
CALCIUM SERPL-MCNC: 9.2 MG/DL (ref 8.6–10.4)
CHLORIDE SERPL-SCNC: 105 MMOL/L (ref 98–107)
CHOLEST SERPL-MCNC: 245 MG/DL
CHOLESTEROL/HDL RATIO: 3.5
CO2 SERPL-SCNC: 25 MMOL/L (ref 20–31)
CREAT SERPL-MCNC: 0.8 MG/DL (ref 0.5–0.9)
EOSINOPHIL # BLD: 0.1 K/UL (ref 0–0.44)
EOSINOPHILS RELATIVE PERCENT: 3 % (ref 1–4)
ERYTHROCYTE [DISTWIDTH] IN BLOOD BY AUTOMATED COUNT: 13 % (ref 11.8–14.4)
GFR SERPL CREATININE-BSD FRML MDRD: >60 ML/MIN/1.73M2
GLUCOSE SERPL-MCNC: 93 MG/DL (ref 70–99)
HCT VFR BLD AUTO: 43.8 % (ref 36.3–47.1)
HDLC SERPL-MCNC: 71 MG/DL
HGB BLD-MCNC: 14.1 G/DL (ref 11.9–15.1)
IMM GRANULOCYTES # BLD AUTO: <0.03 K/UL (ref 0–0.3)
IMM GRANULOCYTES NFR BLD: 0 %
LDLC SERPL CALC-MCNC: 160 MG/DL (ref 0–130)
LYMPHOCYTES NFR BLD: 1.16 K/UL (ref 1.1–3.7)
LYMPHOCYTES RELATIVE PERCENT: 30 % (ref 24–43)
MCH RBC QN AUTO: 32.9 PG (ref 25.2–33.5)
MCHC RBC AUTO-ENTMCNC: 32.2 G/DL (ref 28.4–34.8)
MCV RBC AUTO: 102.3 FL (ref 82.6–102.9)
MONOCYTES NFR BLD: 0.38 K/UL (ref 0.1–1.2)
MONOCYTES NFR BLD: 10 % (ref 3–12)
NEUTROPHILS NFR BLD: 56 % (ref 36–65)
NEUTS SEG NFR BLD: 2.23 K/UL (ref 1.5–8.1)
NRBC BLD-RTO: 0 PER 100 WBC
PLATELET # BLD AUTO: 190 K/UL (ref 138–453)
PMV BLD AUTO: 11.1 FL (ref 8.1–13.5)
POTASSIUM SERPL-SCNC: 4.5 MMOL/L (ref 3.7–5.3)
PROT SERPL-MCNC: 6.9 G/DL (ref 6.4–8.3)
RBC # BLD AUTO: 4.28 M/UL (ref 3.95–5.11)
SODIUM SERPL-SCNC: 140 MMOL/L (ref 135–144)
TRIGL SERPL-MCNC: 71 MG/DL
WBC OTHER # BLD: 3.9 K/UL (ref 3.5–11.3)

## 2024-03-02 PROCEDURE — 85025 COMPLETE CBC W/AUTO DIFF WBC: CPT

## 2024-03-02 PROCEDURE — 82670 ASSAY OF TOTAL ESTRADIOL: CPT

## 2024-03-02 PROCEDURE — 84144 ASSAY OF PROGESTERONE: CPT

## 2024-03-02 PROCEDURE — 84443 ASSAY THYROID STIM HORMONE: CPT

## 2024-03-02 PROCEDURE — 80053 COMPREHEN METABOLIC PANEL: CPT

## 2024-03-02 PROCEDURE — 83002 ASSAY OF GONADOTROPIN (LH): CPT

## 2024-03-02 PROCEDURE — 82679 ASSAY OF ESTRONE: CPT

## 2024-03-02 PROCEDURE — 83001 ASSAY OF GONADOTROPIN (FSH): CPT

## 2024-03-02 PROCEDURE — 80061 LIPID PANEL: CPT

## 2024-03-02 PROCEDURE — 36415 COLL VENOUS BLD VENIPUNCTURE: CPT

## 2024-03-02 PROCEDURE — 84439 ASSAY OF FREE THYROXINE: CPT

## 2024-03-03 LAB
T4 FREE SERPL-MCNC: 1 NG/DL (ref 0.9–1.7)
TSH SERPL DL<=0.05 MIU/L-ACNC: 2.9 UIU/ML (ref 0.3–5)

## 2024-03-05 LAB
ESTRADIOL LEVEL: 13 PG/ML
FSH SERPL-ACNC: 35.9 MIU/ML
LH SERPL-ACNC: 21 MIU/ML (ref 1.7–8.6)
PROGEST SERPL-MCNC: 0.09 NG/ML

## 2024-03-06 LAB — ESTRONE SERPL-MCNC: 30.2 PG/ML

## 2024-04-15 LAB — MAMMOGRAPHY, EXTERNAL: NORMAL

## 2024-04-23 ENCOUNTER — HOSPITAL ENCOUNTER (OUTPATIENT)
Facility: CLINIC | Age: 49
Discharge: HOME | End: 2024-04-23
Payer: COMMERCIAL

## 2024-04-23 DIAGNOSIS — N95.1 PERIMENOPAUSAL: ICD-10-CM

## 2024-04-23 LAB — TESTOST SERPL-MCNC: <3 NG/DL (ref 8–48)

## 2024-04-23 PROCEDURE — 36415 COLL VENOUS BLD VENIPUNCTURE: CPT

## 2024-04-23 PROCEDURE — 84403 ASSAY OF TOTAL TESTOSTERONE: CPT

## 2024-04-23 PROCEDURE — 82627 DEHYDROEPIANDROSTERONE: CPT

## 2024-04-24 LAB — DHEA-S SERPL-MCNC: 85 UG/DL (ref 35.4–256)

## 2024-09-27 ENCOUNTER — TELEPHONE (OUTPATIENT)
Dept: FAMILY MEDICINE CLINIC | Age: 49
End: 2024-09-27

## 2024-09-27 DIAGNOSIS — N95.1 PERIMENOPAUSAL: Primary | ICD-10-CM

## 2024-10-02 ENCOUNTER — HOSPITAL ENCOUNTER (OUTPATIENT)
Facility: CLINIC | Age: 49
Discharge: HOME | End: 2024-10-02
Payer: COMMERCIAL

## 2024-10-02 DIAGNOSIS — N95.1 PERIMENOPAUSAL: ICD-10-CM

## 2024-10-02 LAB
ALBUMIN SERPL-MCNC: 4.5 G/DL (ref 3.5–5.2)
ALBUMIN/GLOB SERPL: 2 {RATIO} (ref 1–2.5)
ALP SERPL-CCNC: 66 U/L (ref 35–104)
ALT SERPL-CCNC: 23 U/L (ref 10–35)
ANION GAP SERPL CALCULATED.3IONS-SCNC: 10 MMOL/L (ref 9–16)
AST SERPL-CCNC: 31 U/L (ref 10–35)
BILIRUB SERPL-MCNC: 0.5 MG/DL (ref 0–1.2)
BUN SERPL-MCNC: 20 MG/DL (ref 6–20)
CALCIUM SERPL-MCNC: 9.4 MG/DL (ref 8.6–10.4)
CHLORIDE SERPL-SCNC: 107 MMOL/L (ref 98–107)
CHOLEST SERPL-MCNC: 203 MG/DL (ref 0–199)
CHOLESTEROL/HDL RATIO: 3
CO2 SERPL-SCNC: 25 MMOL/L (ref 20–31)
CREAT SERPL-MCNC: 0.8 MG/DL (ref 0.5–0.9)
ERYTHROCYTE [DISTWIDTH] IN BLOOD BY AUTOMATED COUNT: 12.7 % (ref 11.8–14.4)
ESTRADIOL LEVEL: 26 PG/ML
GFR, ESTIMATED: >90 ML/MIN/1.73M2
GLUCOSE SERPL-MCNC: 97 MG/DL (ref 74–99)
HCT VFR BLD AUTO: 43 % (ref 36.3–47.1)
HDLC SERPL-MCNC: 73 MG/DL
HGB BLD-MCNC: 13.9 G/DL (ref 11.9–15.1)
LDLC SERPL CALC-MCNC: 117 MG/DL (ref 0–100)
MCH RBC QN AUTO: 32.9 PG (ref 25.2–33.5)
MCHC RBC AUTO-ENTMCNC: 32.3 G/DL (ref 28.4–34.8)
MCV RBC AUTO: 101.7 FL (ref 82.6–102.9)
NRBC BLD-RTO: 0 PER 100 WBC
PLATELET # BLD AUTO: 201 K/UL (ref 138–453)
PMV BLD AUTO: 11.6 FL (ref 8.1–13.5)
POTASSIUM SERPL-SCNC: 5.1 MMOL/L (ref 3.7–5.3)
PROGEST SERPL-MCNC: 0.36 NG/ML
PROT SERPL-MCNC: 6.6 G/DL (ref 6.6–8.7)
RBC # BLD AUTO: 4.23 M/UL (ref 3.95–5.11)
SODIUM SERPL-SCNC: 142 MMOL/L (ref 136–145)
T4 FREE SERPL-MCNC: 1 NG/DL (ref 0.92–1.68)
TESTOST SERPL-MCNC: 15 NG/DL (ref 8–48)
TRIGL SERPL-MCNC: 67 MG/DL
TSH SERPL DL<=0.05 MIU/L-ACNC: 6.79 UIU/ML (ref 0.27–4.2)
VLDLC SERPL CALC-MCNC: 13 MG/DL
WBC OTHER # BLD: 3.9 K/UL (ref 3.5–11.3)

## 2024-10-02 PROCEDURE — 84443 ASSAY THYROID STIM HORMONE: CPT

## 2024-10-02 PROCEDURE — 36415 COLL VENOUS BLD VENIPUNCTURE: CPT

## 2024-10-02 PROCEDURE — 82670 ASSAY OF TOTAL ESTRADIOL: CPT

## 2024-10-02 PROCEDURE — 82679 ASSAY OF ESTRONE: CPT

## 2024-10-02 PROCEDURE — 80053 COMPREHEN METABOLIC PANEL: CPT

## 2024-10-02 PROCEDURE — 84144 ASSAY OF PROGESTERONE: CPT

## 2024-10-02 PROCEDURE — 84439 ASSAY OF FREE THYROXINE: CPT

## 2024-10-02 PROCEDURE — 80061 LIPID PANEL: CPT

## 2024-10-02 PROCEDURE — 82627 DEHYDROEPIANDROSTERONE: CPT

## 2024-10-02 PROCEDURE — 85027 COMPLETE CBC AUTOMATED: CPT

## 2024-10-02 PROCEDURE — 84403 ASSAY OF TOTAL TESTOSTERONE: CPT

## 2024-10-03 LAB — DHEA-S SERPL-MCNC: 100 UG/DL (ref 35.4–256)

## 2024-10-06 LAB — ESTRONE SERPL-MCNC: 48.5 PG/ML

## 2025-01-01 DIAGNOSIS — E03.9 HYPOTHYROIDISM, UNSPECIFIED TYPE: ICD-10-CM

## 2025-01-02 RX ORDER — LEVOTHYROXINE SODIUM 88 UG/1
TABLET ORAL
Qty: 90 TABLET | Refills: 3 | Status: SHIPPED | OUTPATIENT
Start: 2025-01-02

## 2025-05-02 ENCOUNTER — RESULTS FOLLOW-UP (OUTPATIENT)
Dept: FAMILY MEDICINE CLINIC | Age: 50
End: 2025-05-02

## 2025-05-02 ENCOUNTER — HOSPITAL ENCOUNTER (OUTPATIENT)
Facility: CLINIC | Age: 50
Discharge: HOME | End: 2025-05-02
Payer: COMMERCIAL

## 2025-05-02 DIAGNOSIS — N95.1 PERIMENOPAUSAL: ICD-10-CM

## 2025-05-02 DIAGNOSIS — Z00.00 PREVENTATIVE HEALTH CARE: ICD-10-CM

## 2025-05-02 DIAGNOSIS — E03.9 HYPOTHYROIDISM, UNSPECIFIED TYPE: ICD-10-CM

## 2025-05-02 DIAGNOSIS — D70.9 NEUTROPENIA, UNSPECIFIED TYPE: ICD-10-CM

## 2025-05-02 DIAGNOSIS — R53.83 OTHER FATIGUE: ICD-10-CM

## 2025-05-02 LAB
25(OH)D3 SERPL-MCNC: 51.8 NG/ML (ref 30–100)
ALBUMIN SERPL-MCNC: 4.3 G/DL (ref 3.5–5.2)
ALBUMIN/GLOB SERPL: 1.7 {RATIO} (ref 1–2.5)
ALP SERPL-CCNC: 59 U/L (ref 35–104)
ALT SERPL-CCNC: 23 U/L (ref 10–35)
ANION GAP SERPL CALCULATED.3IONS-SCNC: 9 MMOL/L (ref 9–16)
AST SERPL-CCNC: 32 U/L (ref 10–35)
BILIRUB SERPL-MCNC: 0.7 MG/DL (ref 0–1.2)
BUN SERPL-MCNC: 22 MG/DL (ref 6–20)
CALCIUM SERPL-MCNC: 9.5 MG/DL (ref 8.6–10.4)
CHLORIDE SERPL-SCNC: 106 MMOL/L (ref 98–107)
CHOLEST SERPL-MCNC: 246 MG/DL (ref 0–199)
CHOLESTEROL/HDL RATIO: 3.2
CO2 SERPL-SCNC: 26 MMOL/L (ref 20–31)
CREAT SERPL-MCNC: 0.8 MG/DL (ref 0.6–0.9)
DHEA-S SERPL-MCNC: 132 UG/DL (ref 35.4–256)
ERYTHROCYTE [DISTWIDTH] IN BLOOD BY AUTOMATED COUNT: 13.3 % (ref 11.8–14.4)
EST. AVERAGE GLUCOSE BLD GHB EST-MCNC: 97 MG/DL
ESTRADIOL LEVEL: 43.9 PG/ML
FSH SERPL-ACNC: 6.8 MIU/ML
GFR, ESTIMATED: >90 ML/MIN/1.73M2
GLUCOSE SERPL-MCNC: 97 MG/DL (ref 74–99)
HBA1C MFR BLD: 5 % (ref 4–6)
HCT VFR BLD AUTO: 44.6 % (ref 36.3–47.1)
HDLC SERPL-MCNC: 78 MG/DL
HGB BLD-MCNC: 14.7 G/DL (ref 11.9–15.1)
LDLC SERPL CALC-MCNC: 155 MG/DL (ref 0–100)
LH SERPL-ACNC: 4.5 MIU/ML (ref 1.7–8.6)
MCH RBC QN AUTO: 33 PG (ref 25.2–33.5)
MCHC RBC AUTO-ENTMCNC: 33 G/DL (ref 28.4–34.8)
MCV RBC AUTO: 100.2 FL (ref 82.6–102.9)
NRBC BLD-RTO: 0 PER 100 WBC
PLATELET # BLD AUTO: 206 K/UL (ref 138–453)
PMV BLD AUTO: 11.4 FL (ref 8.1–13.5)
POTASSIUM SERPL-SCNC: 4.7 MMOL/L (ref 3.7–5.3)
PROGEST SERPL-MCNC: 0.54 NG/ML
PROT SERPL-MCNC: 6.8 G/DL (ref 6.6–8.7)
RBC # BLD AUTO: 4.45 M/UL (ref 3.95–5.11)
SODIUM SERPL-SCNC: 141 MMOL/L (ref 136–145)
T4 FREE SERPL-MCNC: 1.1 NG/DL (ref 0.92–1.68)
TESTOST SERPL-MCNC: 15 NG/DL (ref 8–48)
TRIGL SERPL-MCNC: 67 MG/DL
TSH SERPL DL<=0.05 MIU/L-ACNC: 2.37 UIU/ML (ref 0.27–4.2)
VIT B12 SERPL-MCNC: 1245 PG/ML (ref 232–1245)
VLDLC SERPL CALC-MCNC: 13 MG/DL (ref 1–30)
WBC OTHER # BLD: 3.5 K/UL (ref 3.5–11.3)

## 2025-05-02 PROCEDURE — 80053 COMPREHEN METABOLIC PANEL: CPT

## 2025-05-02 PROCEDURE — 84439 ASSAY OF FREE THYROXINE: CPT

## 2025-05-02 PROCEDURE — 82607 VITAMIN B-12: CPT

## 2025-05-02 PROCEDURE — 82627 DEHYDROEPIANDROSTERONE: CPT

## 2025-05-02 PROCEDURE — 82306 VITAMIN D 25 HYDROXY: CPT

## 2025-05-02 PROCEDURE — 82679 ASSAY OF ESTRONE: CPT

## 2025-05-02 PROCEDURE — 36415 COLL VENOUS BLD VENIPUNCTURE: CPT

## 2025-05-02 PROCEDURE — 84443 ASSAY THYROID STIM HORMONE: CPT

## 2025-05-02 PROCEDURE — 80061 LIPID PANEL: CPT

## 2025-05-02 PROCEDURE — 84144 ASSAY OF PROGESTERONE: CPT

## 2025-05-02 PROCEDURE — 82670 ASSAY OF TOTAL ESTRADIOL: CPT

## 2025-05-02 PROCEDURE — 83002 ASSAY OF GONADOTROPIN (LH): CPT

## 2025-05-02 PROCEDURE — 83036 HEMOGLOBIN GLYCOSYLATED A1C: CPT

## 2025-05-02 PROCEDURE — 84403 ASSAY OF TOTAL TESTOSTERONE: CPT

## 2025-05-02 PROCEDURE — 85027 COMPLETE CBC AUTOMATED: CPT

## 2025-05-02 PROCEDURE — 83001 ASSAY OF GONADOTROPIN (FSH): CPT

## 2025-05-06 LAB — ESTRONE SERPL-MCNC: 47.8 PG/ML

## 2025-06-08 DIAGNOSIS — G43.909 MIGRAINE WITHOUT STATUS MIGRAINOSUS, NOT INTRACTABLE, UNSPECIFIED MIGRAINE TYPE: ICD-10-CM

## 2025-06-09 RX ORDER — SUMATRIPTAN 6 MG/.5ML
6 INJECTION, SOLUTION SUBCUTANEOUS
Qty: 5 ML | Refills: 3 | Status: SHIPPED | OUTPATIENT
Start: 2025-06-09

## 2025-06-09 NOTE — TELEPHONE ENCOUNTER
Last Visit Date: 2/5/2024   Next Visit Date: 6/23/2025      Patient Comment: Had a migraine last night and would like to have this shot on hand next time.  Thank you

## 2025-06-20 ASSESSMENT — PATIENT HEALTH QUESTIONNAIRE - PHQ9
1. LITTLE INTEREST OR PLEASURE IN DOING THINGS: NOT AT ALL
SUM OF ALL RESPONSES TO PHQ QUESTIONS 1-9: 0
2. FEELING DOWN, DEPRESSED OR HOPELESS: NOT AT ALL
2. FEELING DOWN, DEPRESSED OR HOPELESS: NOT AT ALL
SUM OF ALL RESPONSES TO PHQ9 QUESTIONS 1 & 2: 0
SUM OF ALL RESPONSES TO PHQ QUESTIONS 1-9: 0
SUM OF ALL RESPONSES TO PHQ QUESTIONS 1-9: 0
1. LITTLE INTEREST OR PLEASURE IN DOING THINGS: NOT AT ALL
SUM OF ALL RESPONSES TO PHQ QUESTIONS 1-9: 0

## 2025-06-23 ENCOUNTER — OFFICE VISIT (OUTPATIENT)
Dept: FAMILY MEDICINE CLINIC | Age: 50
End: 2025-06-23
Payer: COMMERCIAL

## 2025-06-23 VITALS
SYSTOLIC BLOOD PRESSURE: 96 MMHG | WEIGHT: 158 LBS | BODY MASS INDEX: 22.62 KG/M2 | OXYGEN SATURATION: 98 % | TEMPERATURE: 98.5 F | RESPIRATION RATE: 16 BRPM | HEIGHT: 70 IN | HEART RATE: 58 BPM | DIASTOLIC BLOOD PRESSURE: 60 MMHG

## 2025-06-23 DIAGNOSIS — Z80.0 FAMILY HISTORY OF COLON CANCER: ICD-10-CM

## 2025-06-23 DIAGNOSIS — Z12.31 ENCOUNTER FOR SCREENING MAMMOGRAM FOR BREAST CANCER: ICD-10-CM

## 2025-06-23 DIAGNOSIS — N95.1 PERIMENOPAUSAL: ICD-10-CM

## 2025-06-23 DIAGNOSIS — F41.9 ANXIETY: ICD-10-CM

## 2025-06-23 DIAGNOSIS — G43.909 MIGRAINE WITHOUT STATUS MIGRAINOSUS, NOT INTRACTABLE, UNSPECIFIED MIGRAINE TYPE: ICD-10-CM

## 2025-06-23 DIAGNOSIS — E03.9 HYPOTHYROIDISM, UNSPECIFIED TYPE: ICD-10-CM

## 2025-06-23 DIAGNOSIS — Z00.00 ANNUAL PHYSICAL EXAM: Primary | ICD-10-CM

## 2025-06-23 DIAGNOSIS — E78.00 PURE HYPERCHOLESTEROLEMIA: ICD-10-CM

## 2025-06-23 PROCEDURE — 99396 PREV VISIT EST AGE 40-64: CPT | Performed by: FAMILY MEDICINE

## 2025-06-23 RX ORDER — CLOBETASOL PROPIONATE 0.5 MG/ML
SOLUTION TOPICAL 2 TIMES DAILY
COMMUNITY
Start: 2025-06-09

## 2025-06-23 RX ORDER — AZELAIC ACID 0.15 G/G
GEL TOPICAL
COMMUNITY
Start: 2025-06-09

## 2025-06-23 SDOH — ECONOMIC STABILITY: FOOD INSECURITY: WITHIN THE PAST 12 MONTHS, THE FOOD YOU BOUGHT JUST DIDN'T LAST AND YOU DIDN'T HAVE MONEY TO GET MORE.: NEVER TRUE

## 2025-06-23 SDOH — ECONOMIC STABILITY: FOOD INSECURITY: WITHIN THE PAST 12 MONTHS, YOU WORRIED THAT YOUR FOOD WOULD RUN OUT BEFORE YOU GOT MONEY TO BUY MORE.: NEVER TRUE

## 2025-06-23 ASSESSMENT — ENCOUNTER SYMPTOMS
SORE THROAT: 0
DIARRHEA: 0
NAUSEA: 0
SINUS PAIN: 0
RHINORRHEA: 0
CONSTIPATION: 0
ABDOMINAL PAIN: 0
CHEST TIGHTNESS: 0
SHORTNESS OF BREATH: 0
ABDOMINAL DISTENTION: 0
VOMITING: 0
BACK PAIN: 0
COUGH: 0

## 2025-06-23 NOTE — PROGRESS NOTES
record, process the conversation to generate a clinical note, and support improvement of the AI technology. The patient (or guardian, if applicable) and other individuals in attendance at the appointment consented to the use of AI, including the recording.      Ofelia Guzman MD

## 2025-08-14 DIAGNOSIS — Z12.31 ENCOUNTER FOR SCREENING MAMMOGRAM FOR BREAST CANCER: ICD-10-CM
